# Patient Record
Sex: MALE | Race: ASIAN | NOT HISPANIC OR LATINO | Employment: FULL TIME | URBAN - METROPOLITAN AREA
[De-identification: names, ages, dates, MRNs, and addresses within clinical notes are randomized per-mention and may not be internally consistent; named-entity substitution may affect disease eponyms.]

---

## 2024-02-15 ENCOUNTER — APPOINTMENT (OUTPATIENT)
Dept: RADIOLOGY | Facility: MEDICAL CENTER | Age: 39
DRG: 305 | End: 2024-02-15
Attending: EMERGENCY MEDICINE
Payer: COMMERCIAL

## 2024-02-15 ENCOUNTER — OFFICE VISIT (OUTPATIENT)
Dept: URGENT CARE | Facility: PHYSICIAN GROUP | Age: 39
End: 2024-02-15
Payer: COMMERCIAL

## 2024-02-15 ENCOUNTER — HOSPITAL ENCOUNTER (INPATIENT)
Facility: MEDICAL CENTER | Age: 39
LOS: 1 days | DRG: 305 | End: 2024-02-17
Attending: EMERGENCY MEDICINE | Admitting: STUDENT IN AN ORGANIZED HEALTH CARE EDUCATION/TRAINING PROGRAM
Payer: COMMERCIAL

## 2024-02-15 VITALS
SYSTOLIC BLOOD PRESSURE: 158 MMHG | RESPIRATION RATE: 16 BRPM | BODY MASS INDEX: 29.31 KG/M2 | OXYGEN SATURATION: 98 % | HEIGHT: 71 IN | HEART RATE: 53 BPM | TEMPERATURE: 97.5 F | DIASTOLIC BLOOD PRESSURE: 108 MMHG | WEIGHT: 209.4 LBS

## 2024-02-15 DIAGNOSIS — R53.1 GENERALIZED WEAKNESS: ICD-10-CM

## 2024-02-15 DIAGNOSIS — R42 DIZZINESS: ICD-10-CM

## 2024-02-15 DIAGNOSIS — R73.03 PREDIABETES: ICD-10-CM

## 2024-02-15 DIAGNOSIS — I16.0 HYPERTENSIVE URGENCY: ICD-10-CM

## 2024-02-15 DIAGNOSIS — R07.9 CHEST PAIN, UNSPECIFIED TYPE: ICD-10-CM

## 2024-02-15 DIAGNOSIS — I50.21 ACUTE HFREF (HEART FAILURE WITH REDUCED EJECTION FRACTION) (HCC): ICD-10-CM

## 2024-02-15 DIAGNOSIS — R06.02 SOB (SHORTNESS OF BREATH): ICD-10-CM

## 2024-02-15 DIAGNOSIS — R59.0 MEDIASTINAL ADENOPATHY: ICD-10-CM

## 2024-02-15 DIAGNOSIS — R94.31 PROLONGED QT INTERVAL: ICD-10-CM

## 2024-02-15 DIAGNOSIS — R03.0 ELEVATED BLOOD PRESSURE READING: ICD-10-CM

## 2024-02-15 DIAGNOSIS — R91.1 PULMONARY NODULE: ICD-10-CM

## 2024-02-15 DIAGNOSIS — I50.20 HFREF (HEART FAILURE WITH REDUCED EJECTION FRACTION) (HCC): ICD-10-CM

## 2024-02-15 DIAGNOSIS — R91.1 INCIDENTAL LUNG NODULE, > 3MM AND < 8MM: ICD-10-CM

## 2024-02-15 PROBLEM — R79.89 ELEVATED TROPONIN: Status: ACTIVE | Noted: 2024-02-15

## 2024-02-15 PROBLEM — R80.1 PERSISTENT PROTEINURIA: Status: ACTIVE | Noted: 2021-01-19

## 2024-02-15 PROBLEM — R74.8 LIVER ENZYME ELEVATION: Status: ACTIVE | Noted: 2021-01-19

## 2024-02-15 PROBLEM — R73.9 ELEVATED BLOOD SUGAR: Status: ACTIVE | Noted: 2024-02-15

## 2024-02-15 LAB
ALBUMIN SERPL BCP-MCNC: 3.9 G/DL (ref 3.2–4.9)
ALBUMIN/GLOB SERPL: 1.3 G/DL
ALP SERPL-CCNC: 78 U/L (ref 30–99)
ALT SERPL-CCNC: 53 U/L (ref 2–50)
AMPHET UR QL SCN: NEGATIVE
ANION GAP SERPL CALC-SCNC: 14 MMOL/L (ref 7–16)
APPEARANCE UR: CLEAR
AST SERPL-CCNC: 38 U/L (ref 12–45)
BACTERIA #/AREA URNS HPF: NEGATIVE /HPF
BARBITURATES UR QL SCN: NEGATIVE
BASOPHILS # BLD AUTO: 1.3 % (ref 0–1.8)
BASOPHILS # BLD: 0.12 K/UL (ref 0–0.12)
BENZODIAZ UR QL SCN: NEGATIVE
BILIRUB SERPL-MCNC: 1.4 MG/DL (ref 0.1–1.5)
BILIRUB UR QL STRIP.AUTO: NEGATIVE
BUN SERPL-MCNC: 16 MG/DL (ref 8–22)
BZE UR QL SCN: NEGATIVE
CALCIUM ALBUM COR SERPL-MCNC: 9 MG/DL (ref 8.5–10.5)
CALCIUM SERPL-MCNC: 8.9 MG/DL (ref 8.5–10.5)
CANNABINOIDS UR QL SCN: POSITIVE
CHLORIDE SERPL-SCNC: 108 MMOL/L (ref 96–112)
CO2 SERPL-SCNC: 20 MMOL/L (ref 20–33)
COLOR UR: YELLOW
CREAT SERPL-MCNC: 1.39 MG/DL (ref 0.5–1.4)
EKG IMPRESSION: NORMAL
EKG IMPRESSION: NORMAL
EOSINOPHIL # BLD AUTO: 0.17 K/UL (ref 0–0.51)
EOSINOPHIL NFR BLD: 1.8 % (ref 0–6.9)
EPI CELLS #/AREA URNS HPF: NEGATIVE /HPF
ERYTHROCYTE [DISTWIDTH] IN BLOOD BY AUTOMATED COUNT: 38.5 FL (ref 35.9–50)
EST. AVERAGE GLUCOSE BLD GHB EST-MCNC: 128 MG/DL
FENTANYL UR QL: NEGATIVE
GFR SERPLBLD CREATININE-BSD FMLA CKD-EPI: 67 ML/MIN/1.73 M 2
GLOBULIN SER CALC-MCNC: 2.9 G/DL (ref 1.9–3.5)
GLUCOSE SERPL-MCNC: 108 MG/DL (ref 65–99)
GLUCOSE UR STRIP.AUTO-MCNC: NEGATIVE MG/DL
HBA1C MFR BLD: 6.1 % (ref 4–5.6)
HCT VFR BLD AUTO: 49.6 % (ref 42–52)
HGB BLD-MCNC: 16.6 G/DL (ref 14–18)
HYALINE CASTS #/AREA URNS LPF: ABNORMAL /LPF
IMM GRANULOCYTES # BLD AUTO: 0.04 K/UL (ref 0–0.11)
IMM GRANULOCYTES NFR BLD AUTO: 0.4 % (ref 0–0.9)
KETONES UR STRIP.AUTO-MCNC: NEGATIVE MG/DL
LEUKOCYTE ESTERASE UR QL STRIP.AUTO: NEGATIVE
LYMPHOCYTES # BLD AUTO: 1.66 K/UL (ref 1–4.8)
LYMPHOCYTES NFR BLD: 17.3 % (ref 22–41)
MCH RBC QN AUTO: 25.9 PG (ref 27–33)
MCHC RBC AUTO-ENTMCNC: 33.5 G/DL (ref 32.3–36.5)
MCV RBC AUTO: 77.3 FL (ref 81.4–97.8)
METHADONE UR QL SCN: NEGATIVE
MICRO URNS: ABNORMAL
MONOCYTES # BLD AUTO: 0.48 K/UL (ref 0–0.85)
MONOCYTES NFR BLD AUTO: 5 % (ref 0–13.4)
NEUTROPHILS # BLD AUTO: 7.12 K/UL (ref 1.82–7.42)
NEUTROPHILS NFR BLD: 74.2 % (ref 44–72)
NITRITE UR QL STRIP.AUTO: NEGATIVE
NRBC # BLD AUTO: 0 K/UL
NRBC BLD-RTO: 0 /100 WBC (ref 0–0.2)
OPIATES UR QL SCN: NEGATIVE
OXYCODONE UR QL SCN: NEGATIVE
PCP UR QL SCN: NEGATIVE
PH UR STRIP.AUTO: 7 [PH] (ref 5–8)
PLATELET # BLD AUTO: 291 K/UL (ref 164–446)
PMV BLD AUTO: 9.3 FL (ref 9–12.9)
POTASSIUM SERPL-SCNC: 4.2 MMOL/L (ref 3.6–5.5)
PROPOXYPH UR QL SCN: NEGATIVE
PROT SERPL-MCNC: 6.8 G/DL (ref 6–8.2)
PROT UR QL STRIP: 100 MG/DL
RBC # BLD AUTO: 6.42 M/UL (ref 4.7–6.1)
RBC # URNS HPF: ABNORMAL /HPF
RBC UR QL AUTO: NEGATIVE
SODIUM SERPL-SCNC: 142 MMOL/L (ref 135–145)
SP GR UR STRIP.AUTO: 1.03
T4 FREE SERPL-MCNC: 1.96 NG/DL (ref 0.93–1.7)
TROPONIN T SERPL-MCNC: 29 NG/L (ref 6–19)
TROPONIN T SERPL-MCNC: 30 NG/L (ref 6–19)
TSH SERPL DL<=0.005 MIU/L-ACNC: 1.2 UIU/ML (ref 0.38–5.33)
UROBILINOGEN UR STRIP.AUTO-MCNC: 0.2 MG/DL
WBC # BLD AUTO: 9.6 K/UL (ref 4.8–10.8)
WBC #/AREA URNS HPF: ABNORMAL /HPF

## 2024-02-15 PROCEDURE — 85025 COMPLETE CBC W/AUTO DIFF WBC: CPT

## 2024-02-15 PROCEDURE — 81001 URINALYSIS AUTO W/SCOPE: CPT

## 2024-02-15 PROCEDURE — 71275 CT ANGIOGRAPHY CHEST: CPT

## 2024-02-15 PROCEDURE — 80053 COMPREHEN METABOLIC PANEL: CPT

## 2024-02-15 PROCEDURE — G0378 HOSPITAL OBSERVATION PER HR: HCPCS

## 2024-02-15 PROCEDURE — 36415 COLL VENOUS BLD VENIPUNCTURE: CPT

## 2024-02-15 PROCEDURE — 83036 HEMOGLOBIN GLYCOSYLATED A1C: CPT

## 2024-02-15 PROCEDURE — 99205 OFFICE O/P NEW HI 60 MIN: CPT | Performed by: STUDENT IN AN ORGANIZED HEALTH CARE EDUCATION/TRAINING PROGRAM

## 2024-02-15 PROCEDURE — 80307 DRUG TEST PRSMV CHEM ANLYZR: CPT

## 2024-02-15 PROCEDURE — 700102 HCHG RX REV CODE 250 W/ 637 OVERRIDE(OP): Performed by: STUDENT IN AN ORGANIZED HEALTH CARE EDUCATION/TRAINING PROGRAM

## 2024-02-15 PROCEDURE — 99205 OFFICE O/P NEW HI 60 MIN: CPT | Performed by: NURSE PRACTITIONER

## 2024-02-15 PROCEDURE — 96375 TX/PRO/DX INJ NEW DRUG ADDON: CPT

## 2024-02-15 PROCEDURE — 96374 THER/PROPH/DIAG INJ IV PUSH: CPT

## 2024-02-15 PROCEDURE — 99285 EMERGENCY DEPT VISIT HI MDM: CPT

## 2024-02-15 PROCEDURE — 3E02340 INTRODUCTION OF INFLUENZA VACCINE INTO MUSCLE, PERCUTANEOUS APPROACH: ICD-10-PCS | Performed by: STUDENT IN AN ORGANIZED HEALTH CARE EDUCATION/TRAINING PROGRAM

## 2024-02-15 PROCEDURE — 90686 IIV4 VACC NO PRSV 0.5 ML IM: CPT | Performed by: STUDENT IN AN ORGANIZED HEALTH CARE EDUCATION/TRAINING PROGRAM

## 2024-02-15 PROCEDURE — 93005 ELECTROCARDIOGRAM TRACING: CPT | Performed by: EMERGENCY MEDICINE

## 2024-02-15 PROCEDURE — 90471 IMMUNIZATION ADMIN: CPT

## 2024-02-15 PROCEDURE — 96376 TX/PRO/DX INJ SAME DRUG ADON: CPT

## 2024-02-15 PROCEDURE — 700111 HCHG RX REV CODE 636 W/ 250 OVERRIDE (IP): Performed by: STUDENT IN AN ORGANIZED HEALTH CARE EDUCATION/TRAINING PROGRAM

## 2024-02-15 PROCEDURE — 3080F DIAST BP >= 90 MM HG: CPT | Performed by: NURSE PRACTITIONER

## 2024-02-15 PROCEDURE — 84443 ASSAY THYROID STIM HORMONE: CPT

## 2024-02-15 PROCEDURE — 84439 ASSAY OF FREE THYROXINE: CPT

## 2024-02-15 PROCEDURE — 84484 ASSAY OF TROPONIN QUANT: CPT

## 2024-02-15 PROCEDURE — 71045 X-RAY EXAM CHEST 1 VIEW: CPT

## 2024-02-15 PROCEDURE — 700111 HCHG RX REV CODE 636 W/ 250 OVERRIDE (IP): Mod: JZ | Performed by: EMERGENCY MEDICINE

## 2024-02-15 PROCEDURE — A9270 NON-COVERED ITEM OR SERVICE: HCPCS | Performed by: STUDENT IN AN ORGANIZED HEALTH CARE EDUCATION/TRAINING PROGRAM

## 2024-02-15 PROCEDURE — 700105 HCHG RX REV CODE 258: Performed by: EMERGENCY MEDICINE

## 2024-02-15 PROCEDURE — 3077F SYST BP >= 140 MM HG: CPT | Performed by: NURSE PRACTITIONER

## 2024-02-15 PROCEDURE — 93005 ELECTROCARDIOGRAM TRACING: CPT

## 2024-02-15 PROCEDURE — 700117 HCHG RX CONTRAST REV CODE 255: Performed by: EMERGENCY MEDICINE

## 2024-02-15 RX ORDER — HYDRALAZINE HYDROCHLORIDE 20 MG/ML
10 INJECTION INTRAMUSCULAR; INTRAVENOUS ONCE
Status: COMPLETED | OUTPATIENT
Start: 2024-02-15 | End: 2024-02-15

## 2024-02-15 RX ORDER — LABETALOL HYDROCHLORIDE 5 MG/ML
10 INJECTION, SOLUTION INTRAVENOUS EVERY 4 HOURS PRN
Status: DISCONTINUED | OUTPATIENT
Start: 2024-02-15 | End: 2024-02-15

## 2024-02-15 RX ORDER — SODIUM CHLORIDE 9 MG/ML
1000 INJECTION, SOLUTION INTRAVENOUS ONCE
Status: COMPLETED | OUTPATIENT
Start: 2024-02-15 | End: 2024-02-15

## 2024-02-15 RX ORDER — ACETAMINOPHEN 325 MG/1
650 TABLET ORAL EVERY 6 HOURS PRN
Status: DISCONTINUED | OUTPATIENT
Start: 2024-02-15 | End: 2024-02-17 | Stop reason: HOSPADM

## 2024-02-15 RX ORDER — LORAZEPAM 2 MG/ML
2 INJECTION INTRAMUSCULAR EVERY 4 HOURS PRN
Status: DISCONTINUED | OUTPATIENT
Start: 2024-02-15 | End: 2024-02-17 | Stop reason: HOSPADM

## 2024-02-15 RX ORDER — LORAZEPAM 2 MG/ML
0.5 INJECTION INTRAMUSCULAR ONCE
Status: COMPLETED | OUTPATIENT
Start: 2024-02-15 | End: 2024-02-15

## 2024-02-15 RX ORDER — LISINOPRIL 20 MG/1
20 TABLET ORAL EVERY EVENING
Status: DISCONTINUED | OUTPATIENT
Start: 2024-02-15 | End: 2024-02-16

## 2024-02-15 RX ORDER — CARVEDILOL 12.5 MG/1
12.5 TABLET ORAL 2 TIMES DAILY WITH MEALS
Status: DISCONTINUED | OUTPATIENT
Start: 2024-02-15 | End: 2024-02-16

## 2024-02-15 RX ORDER — ONDANSETRON 2 MG/ML
4 INJECTION INTRAMUSCULAR; INTRAVENOUS ONCE
Status: COMPLETED | OUTPATIENT
Start: 2024-02-15 | End: 2024-02-15

## 2024-02-15 RX ORDER — LABETALOL HYDROCHLORIDE 5 MG/ML
10 INJECTION, SOLUTION INTRAVENOUS ONCE
Status: COMPLETED | OUTPATIENT
Start: 2024-02-15 | End: 2024-02-15

## 2024-02-15 RX ORDER — HYDRALAZINE HYDROCHLORIDE 20 MG/ML
20 INJECTION INTRAMUSCULAR; INTRAVENOUS EVERY 6 HOURS PRN
Status: DISCONTINUED | OUTPATIENT
Start: 2024-02-15 | End: 2024-02-17 | Stop reason: HOSPADM

## 2024-02-15 RX ORDER — ASPIRIN 325 MG
650 TABLET ORAL 2 TIMES DAILY PRN
Status: ON HOLD | COMMUNITY
End: 2024-02-17

## 2024-02-15 RX ORDER — MORPHINE SULFATE 4 MG/ML
4 INJECTION INTRAVENOUS EVERY 4 HOURS PRN
Status: DISCONTINUED | OUTPATIENT
Start: 2024-02-15 | End: 2024-02-17 | Stop reason: HOSPADM

## 2024-02-15 RX ADMIN — CARVEDILOL 12.5 MG: 12.5 TABLET, FILM COATED ORAL at 23:10

## 2024-02-15 RX ADMIN — INFLUENZA A VIRUS A/VICTORIA/4897/2022 IVR-238 (H1N1) ANTIGEN (FORMALDEHYDE INACTIVATED), INFLUENZA A VIRUS A/DARWIN/9/2021 SAN-010 (H3N2) ANTIGEN (FORMALDEHYDE INACTIVATED), INFLUENZA B VIRUS B/PHUKET/3073/2013 ANTIGEN (FORMALDEHYDE INACTIVATED), AND INFLUENZA B VIRUS B/MICHIGAN/01/2021 ANTIGEN (FORMALDEHYDE INACTIVATED) 0.5 ML: 15; 15; 15; 15 INJECTION, SUSPENSION INTRAMUSCULAR at 23:10

## 2024-02-15 RX ADMIN — HYDRALAZINE HYDROCHLORIDE 10 MG: 20 INJECTION, SOLUTION INTRAMUSCULAR; INTRAVENOUS at 18:22

## 2024-02-15 RX ADMIN — LABETALOL HYDROCHLORIDE 10 MG: 5 INJECTION, SOLUTION INTRAVENOUS at 16:10

## 2024-02-15 RX ADMIN — SODIUM CHLORIDE 1000 ML: 9 INJECTION, SOLUTION INTRAVENOUS at 20:51

## 2024-02-15 RX ADMIN — HYDRALAZINE HYDROCHLORIDE 10 MG: 20 INJECTION, SOLUTION INTRAMUSCULAR; INTRAVENOUS at 16:53

## 2024-02-15 RX ADMIN — LORAZEPAM 0.5 MG: 2 INJECTION INTRAMUSCULAR; INTRAVENOUS at 14:21

## 2024-02-15 RX ADMIN — MORPHINE SULFATE 4 MG: 4 INJECTION, SOLUTION INTRAMUSCULAR; INTRAVENOUS at 21:11

## 2024-02-15 RX ADMIN — LABETALOL HYDROCHLORIDE 10 MG: 5 INJECTION, SOLUTION INTRAVENOUS at 14:21

## 2024-02-15 RX ADMIN — IOHEXOL 100 ML: 350 INJECTION, SOLUTION INTRAVENOUS at 16:43

## 2024-02-15 RX ADMIN — LISINOPRIL 20 MG: 20 TABLET ORAL at 20:48

## 2024-02-15 RX ADMIN — ONDANSETRON 4 MG: 2 INJECTION INTRAMUSCULAR; INTRAVENOUS at 18:18

## 2024-02-15 ASSESSMENT — PAIN DESCRIPTION - PAIN TYPE
TYPE: ACUTE PAIN
TYPE: ACUTE PAIN

## 2024-02-15 ASSESSMENT — PATIENT HEALTH QUESTIONNAIRE - PHQ9
2. FEELING DOWN, DEPRESSED, IRRITABLE, OR HOPELESS: NOT AT ALL
1. LITTLE INTEREST OR PLEASURE IN DOING THINGS: NOT AT ALL
SUM OF ALL RESPONSES TO PHQ9 QUESTIONS 1 AND 2: 0

## 2024-02-15 ASSESSMENT — ENCOUNTER SYMPTOMS
PALPITATIONS: 1
SHORTNESS OF BREATH: 1
COUGH: 0
VOMITING: 0
SORE THROAT: 0
NERVOUS/ANXIOUS: 0
FEVER: 0
HEARTBURN: 0
DIZZINESS: 1

## 2024-02-15 ASSESSMENT — LIFESTYLE VARIABLES
ALCOHOL_USE: YES
TOTAL SCORE: 0
DOES PATIENT WANT TO STOP DRINKING: NO
ON A TYPICAL DAY WHEN YOU DRINK ALCOHOL HOW MANY DRINKS DO YOU HAVE: 0
TOTAL SCORE: 0
DO YOU DRINK ALCOHOL: NO
HOW MANY TIMES IN THE PAST YEAR HAVE YOU HAD 5 OR MORE DRINKS IN A DAY: 0
HAVE PEOPLE ANNOYED YOU BY CRITICIZING YOUR DRINKING: NO
AVERAGE NUMBER OF DAYS PER WEEK YOU HAVE A DRINK CONTAINING ALCOHOL: 0
CONSUMPTION TOTAL: NEGATIVE
TOTAL SCORE: 0
EVER FELT BAD OR GUILTY ABOUT YOUR DRINKING: NO
EVER HAD A DRINK FIRST THING IN THE MORNING TO STEADY YOUR NERVES TO GET RID OF A HANGOVER: NO
HAVE YOU EVER FELT YOU SHOULD CUT DOWN ON YOUR DRINKING: NO

## 2024-02-15 ASSESSMENT — FIBROSIS 4 INDEX: FIB4 SCORE: 0.65

## 2024-02-15 NOTE — ED TRIAGE NOTES
"Chief Complaint   Patient presents with    Chest Pain     'Sharp / twisting' pain 2 days ago lasting for 4 hours, L chest, associated exhaustion during and afterwards, denies cardiac hx, pt states he has not had chest pain since then, seen in urgent care today and sent here for further workup       Pt ambulatory to triage for above complaints, VSS on RA, GCS 15, NAD.    [facility] Educated on triage process and to inform staff of any changes.     BP (!) 191/152   Pulse (!) 111   Temp 36.2 °C (97.1 °F) (Temporal)   Resp 16   Ht 1.778 m (5' 10\")   Wt 94.4 kg (208 lb 1.8 oz)   SpO2 98%   BMI 29.86 kg/m²     "

## 2024-02-15 NOTE — ED NOTES
Pt ambulated to red 12  Provided with gown to change, placed monitor  Iv started,blood sent to lab

## 2024-02-15 NOTE — ED PROVIDER NOTES
"  ER Provider Note    Scribed for Morena Garcia M.d. by Azul Cross. 2/15/2024  1:29 PM    Primary Care Provider: Pcp Pt States None    CHIEF COMPLAINT  Chief Complaint   Patient presents with    Chest Pain     'Sharp / twisting' pain 2 days ago lasting for 4 hours, L chest, associated exhaustion during and afterwards, denies cardiac hx, pt states he has not had chest pain since then, seen in urgent care today and sent here for further workup       EXTERNAL RECORDS REVIEWED  Outpatient Notes Patient was seen by [facility] via telemedicine January 2021 for proteinuria. Reports at that time that he has not seen a doctor for 8 years. Reports no medical problems at that time, other than glucose intolerance. Patient went to Urgent Care earlier today and was sent here for further evaluation.      HPI/ROS  LIMITATION TO HISTORY   Select: : None  OUTSIDE HISTORIAN(S):  None    Gaetano Boles is a 38 y.o. male who presents to the ED complaining of chest pain onset two days ago.  The patient reports that he was driving in his vehicle 2 days ago when his pain began and notes that it lasted for about  approximately 4 hours. He describes his pain as \"twisting\" pain by his heart and next to his lungs.  He has not had any chest pain since that episode.  Patient reports feeling lightheaded, diaphoretic, short of breath and nauseated during this episode of chest pain.  He notes that he does not have chest pain now.  However, the patient reports that he has been feeling exhausted since that episode of chest pain 2 days ago, and it is for that reason that he came to the ER.. He notes that he went to Urgent Care earlier today, where they performed a EKG and noticed an abnormality. He was sent to the ED for further evaluation. Since arriving here, he has been feeling waves of lightheadedness and fatigue. He describes his lightheadedness as \"sea sickness.\"  He said he had some mild abdominal pain today as well. He " "denies any numbnessor tingling to extremities, weakness of extremities, nausea, vomiting, pain or swelling in his legs, cough, or runny nose. He denies having pain like this before. He is unsure of a history of hypertension. He denies any family history of heart problems.     PAST MEDICAL HISTORY  History reviewed. No pertinent past medical history.    SURGICAL HISTORY  No past surgical history noted.     FAMILY HISTORY  No family history noted.     SOCIAL HISTORY   None noted.     CURRENT MEDICATIONS  No current outpatient medications     ALLERGIES  Patient has no known allergies.    PHYSICAL EXAM  BP (!) 199/147   Pulse (!) 101   Temp 36.2 °C (97.1 °F) (Temporal)   Resp 20   Ht 1.778 m (5' 10\")   Wt 94.4 kg (208 lb 1.8 oz)   SpO2 95%   BMI 29.86 kg/m²   Constitutional: Anxious appearing, Well developed, well nourished;   HENT: Normocephalic, Atraumatic, Bilateral external ears normal, moist mucous membranes.  Eyes: PERRL, EOMI, Conjunctiva normal, No discharge.   Neck: Normal range of motion, supple, nontender  Lymphatic: No lymphadenopathy noted.   Cardiovascular: Tachycardic but regular., Normal rhythm, No murmurs, rubs or gallops   Thorax & Lungs: CTA=bilaterally;  No respiratory distress,  No wheezing rales, or rhonchi; No chest tenderness. No crepitus or subQ air  Abdomen: Nontender, no guarding no rebound, no masses, no pulsatile mass, no tenderness, no distention  Skin: Warm, Dry, No erythema, No rash.   Back: No tenderness, No CVA tenderness.   Extremities: 2+ dp and pt pulses bilateral LEs;  Nontender; no pretibial edema  Neurologic: Alert & oriented x 4, clear speech  Psychiatric: appropriate, normal affect     DIAGNOSTIC STUDIES    Labs:   I have independently interpreted these labs.    Results for orders placed or performed during the hospital encounter of 02/15/24   CBC with Differential   Result Value Ref Range    WBC 9.6 4.8 - 10.8 K/uL    RBC 6.42 (H) 4.70 - 6.10 M/uL    Hemoglobin 16.6 14.0 " - 18.0 g/dL    Hematocrit 49.6 42.0 - 52.0 %    MCV 77.3 (L) 81.4 - 97.8 fL    MCH 25.9 (L) 27.0 - 33.0 pg    MCHC 33.5 32.3 - 36.5 g/dL    RDW 38.5 35.9 - 50.0 fL    Platelet Count 291 164 - 446 K/uL    MPV 9.3 9.0 - 12.9 fL    Neutrophils-Polys 74.20 (H) 44.00 - 72.00 %    Lymphocytes 17.30 (L) 22.00 - 41.00 %    Monocytes 5.00 0.00 - 13.40 %    Eosinophils 1.80 0.00 - 6.90 %    Basophils 1.30 0.00 - 1.80 %    Immature Granulocytes 0.40 0.00 - 0.90 %    Nucleated RBC 0.00 0.00 - 0.20 /100 WBC    Neutrophils (Absolute) 7.12 1.82 - 7.42 K/uL    Lymphs (Absolute) 1.66 1.00 - 4.80 K/uL    Monos (Absolute) 0.48 0.00 - 0.85 K/uL    Eos (Absolute) 0.17 0.00 - 0.51 K/uL    Baso (Absolute) 0.12 0.00 - 0.12 K/uL    Immature Granulocytes (abs) 0.04 0.00 - 0.11 K/uL    NRBC (Absolute) 0.00 K/uL   Complete Metabolic Panel (CMP)   Result Value Ref Range    Sodium 142 135 - 145 mmol/L    Potassium 4.2 3.6 - 5.5 mmol/L    Chloride 108 96 - 112 mmol/L    Co2 20 20 - 33 mmol/L    Anion Gap 14.0 7.0 - 16.0    Glucose 108 (H) 65 - 99 mg/dL    Bun 16 8 - 22 mg/dL    Creatinine 1.39 0.50 - 1.40 mg/dL    Calcium 8.9 8.5 - 10.5 mg/dL    Correct Calcium 9.0 8.5 - 10.5 mg/dL    AST(SGOT) 38 12 - 45 U/L    ALT(SGPT) 53 (H) 2 - 50 U/L    Alkaline Phosphatase 78 30 - 99 U/L    Total Bilirubin 1.4 0.1 - 1.5 mg/dL    Albumin 3.9 3.2 - 4.9 g/dL    Total Protein 6.8 6.0 - 8.2 g/dL    Globulin 2.9 1.9 - 3.5 g/dL    A-G Ratio 1.3 g/dL   Troponins NOW   Result Value Ref Range    Troponin T 29 (H) 6 - 19 ng/L   Troponins in two (2) hours   Result Value Ref Range    Troponin T 30 (H) 6 - 19 ng/L   ESTIMATED GFR   Result Value Ref Range    GFR (CKD-EPI) 67 >60 mL/min/1.73 m 2   URINE DRUG SCREEN   Result Value Ref Range    Amphetamines Urine Negative Negative    Barbiturates Negative Negative    Benzodiazepines Negative Negative    Cocaine Metabolite Negative Negative    Fentanyl, Urine Negative Negative    Methadone Negative Negative    Opiates  Negative Negative    Oxycodone Negative Negative    Phencyclidine -Pcp Negative Negative    Propoxyphene Negative Negative    Cannabinoid Metab Positive (A) Negative   TSH   Result Value Ref Range    TSH 1.200 0.380 - 5.330 uIU/mL   FREE THYROXINE   Result Value Ref Range    Free T-4 1.96 (H) 0.93 - 1.70 ng/dL   HEMOGLOBIN A1C   Result Value Ref Range    Glycohemoglobin 6.1 (H) 4.0 - 5.6 %    Est Avg Glucose 128 mg/dL   EKG   Result Value Ref Range    Report       [facility] Emergency Dept.    Test Date:  2024-02-15  Pt Name:    ARELY IGNACIO                   Department: ER  MRN:        [id number]                      Room:  Gender:     Male                         Technician: [id number]  :        1985                   Requested By:ER TRIAGE PROTOCOL  Order #:    [id number]                    Reading MD: Morena Garcia    Measurements  Intervals                                Axis  Rate:       112                          P:          45  AZ:         165                          QRS:        4  QRSD:       104                          T:          52  QT:         365  QTc:        499    Interpretive Statements  Sinus tachycardia rate 112  Normal axis  Minimal ST depression II, III, AVF, V5 and V6  No ST elevation  Left atrial enlargement  Left ventricular hypertrophy  Anterior ST elevation, probably due to LVH  Borderline prolonged QT interval  No previous ECG available for comparison  Electronically Signed On 02- 21:19:20 PST  by Morena Garcia     EKG   Result Value Ref Range    Report       [facility] Emergency Dept.    Test Date:  2024-02-15  Pt Name:    ARELY IGNACIO                   Department: ER  MRN:        [id number]                      Room:  Gender:     Male                         Technician: [id number]  :        1985                   Requested By:ER TRIAGE PROTOCOL  Order #:    [id number]                    Reading MD: Morena Garcia    Measurements  Intervals                                 Axis  Rate:       109                          P:          44  PA:         165                          QRS:        9  QRSD:       105                          T:          47  QT:         375  QTc:        506    Interpretive Statements  Sinus tachycardia rate 109  Normal axis  Minimal ST depression II, III, AVF, V5 and V6  NO ST elevation  Left atrial enlargement  Left ventricular hypertrophy  Prolonged QT interval  Compared to ECG 02/15/2024 12:12:58  ST (T wave) deviation still present  Electronically Signed On 02- 21:20:49 PST by Morena Garcia        EK Lead EKG interpreted by me as shown above       Radiology:   The attending emergency physician has independently interpreted the diagnostic imaging associated with this visit and am waiting the final reading from the radiologist.     Preliminary interpretation is a follows: ER MD is reviewed the patient's chest x-ray.  He appears to have some cardiomegaly.    Radiologist interpretation:   CT-CTA COMPLETE THORACOABDOMINAL AORTA   Final Result      1.  No evidence of aortic aneurysm or dissection.      2.  Enlarged mediastinal and right hilar lymph nodes. Consideration should be given for infectious or neoplastic process.      3.  4 mm pulmonary nodule within the right lower lobe.      4.  Fatty liver.      5.  Mild splenomegaly.      Fleischner Society pulmonary nodule recommendations:   Low Risk: No routine follow-up      High Risk: Optional CT at 12 months      Comments: Nodules less than 6 mm do not require routine follow-up, but certain patients at high risk with suspicious nodule morphology, upper lobe location, or both may warrant 12-month follow-up.      Low Risk - Minimal or absent history of smoking and of other known risk factors.      High Risk - History of smoking or of other known risk factors.      Note: These recommendations do not apply to lung cancer screening, patients with immunosuppression, or patients with known  primary cancer.      Fleischner Society 2017 Guidelines for Management of Incidentally Detected Pulmonary Nodules in Adults                           DX-CHEST-PORTABLE (1 VIEW)   Final Result      No acute cardiac or pulmonary abnormalities are identified.      NM-CARDIAC STRESS TEST    (Results Pending)   EC-ECHOCARDIOGRAM COMPLETE W/O CONT    (Results Pending)        COURSE & MEDICAL DECISION MAKING     ED Observation Status? No; Patient does not meet criteria for ED Observation.     INITIAL ASSESSMENT, COURSE AND PLAN  Care Narrative: Patient presents to the ER complaining of generalized fatigue and weakness which began 2 days ago after an episode of chest pain.  He was driving in his car when he suddenly had a twisting feeling in his chest.  The symptoms lasted about 4 hours.  He had associated shortness of breath, lightheadedness, diaphoresis and nausea with his chest pain.  Chest pain resolved and has not returned.  He is currently chest pain-free here in the ER.  He went to urgent care today over the concern of his generalized weakness since the onset of chest pain and was referred here to the emergency department over concern about an abnormal EKG.  Patient's EKG reveals what looks like LVH with a little bit of ST depression in the inferior leads.  There is no ST elevation.  Patient's troponin was initially elevated at 29.  Repeat troponin came back elevated at 30.  Patient has not had any chest pain in the last couple days.  Again, he is currently chest pain-free here in the ER.  Blood pressure is extremely high upon arrival.  Initial blood pressure was 191/152.  He was given several rounds of labetalol as well as several rounds of hydralazine and repeat blood pressure after multiple rounds of IV antihypertensives is now 175/121.  He has no known history of hypertension, but given his mildly enlarged heart on chest x-ray and his LVH on EKG, I suspect patient likely has history of hypertension.  Review of  records revealed that he was seen by nephrology for proteinuria several years ago, but I do not really know what came of that.  It was a telemedicine visit at that time.  Patient does not know his family history.  He has not seen a doctor in many many years.  Given patient's markedly elevated blood pressure with complaint of chest pain, lightheadedness, and feeling generally weak, I was concerned about the possibility of an aortic dissection which caused his chest pain.  CT thoracoabdominal aorta was performed and it is negative for any dissection.  The patient does have some enlarged mediastinal adenopathy, however, of unclear etiology at this time.  He also has a small pulmonary nodule which will need to be followed up.  Patient was advised of these findings and understands importance of follow-up with primary care upon discharge from the hospital.  Patient will need to be admitted for hypertensive urgency and abnormal EKG, likely related to hypertension.  At this time there is no evidence of STEMI or non-STEMI.  No evidence of stroke.  He has no headache.  I do not think he needs a CT brain.  He does, however, need aggressive blood pressure management as an inpatient and cardiac workup.  I spoke with Dr. Kenny Mccann, hospitalist on-call, he will kindly evaluate the patient hospitalization.    3:21 PM - Patient presents to the ED with chest pain, dizziness, shortness of breath, sweating, abdominal pain, and fatigue. Patient denies any numbness, nausea, vomiting, tingling, pain or swelling in his legs, cough, or runny nose. See HPI for further details. Discussed the plan of care, including ordering labs and imaging for further evaluation. Patient verbalizes understanding and agreement to this plan of care.     4:05 PM - Patient was reevaluated at bedside. Discussed lab and radiology results with the patient.     6:20 PM - Patient was reevaluated at bedside. Patient had just vomited and is hypertensive.     6:49  PM - I discussed the patient's case and the above findings with Dr. Mccann (Hospitalist) who agrees to evaluate the patient for admission.     6:52 PM - Patient was reevaluated at bedside. The patient's blood pressure is 173/121. His nausea has resolved after administration of Zofran.. Denies any chest pain or headache at this time. The patient denies having any history of hypertension. Discussed lab and radiology results with the patient and informed them that his blood pressure is elevated and that the left side of his heart appears enlarged. Discussed the plan for admission with the patient. The patient was informed at this time and agrees to the plan of care.     ER MD advised the patient about the CT findings of some mediastinal adenopathy as well as pulmonary nodule which will need follow-up  Upon discharge from the hospital.    HTN/IDDM FOLLOW UP:  The patient is referred to a primary physician for blood pressure management, diabetic screening, and for all other preventive health concerns    ADDITIONAL PROBLEM LIST  Problem #1: Chest pain for approximately 4 hours 2 days ago.  No chest pain since then.  Problem #2: Intermittent lightheadedness over the last several days  Problem #3: Generalized fatigue x 2 days since episode of chest pain.    DISPOSITION AND DISCUSSIONS  I have discussed management of the patient with the following physicians and YURY's: Dr. Mccann (Hospitalist)    Discussion of management with other [facility] or appropriate source(s): none     Escalation of care considered, and ultimately not performed: diagnostic imaging.  No complaints of headache or any neurologic symptoms.  No need for CT brain at this time.    Barriers to care at this time, including but not limited to: Patient does not have established PCP.     Decision tools and prescription drugs considered including, but not limited to: Antibiotics no evidence of pneumonia.  No need for antibiotics. .    CRITICAL CARE  The very real  possibilty of a deterioration of this patient's condition required the highest level of my preparedness for sudden, emergent intervention.  I provided critical care services, which included medication orders, frequent reevaluations of the patient's condition and response to treatment, ordering and reviewing test results, and discussing the case with various consultants.  The critical care time associated with the care of the patient was 35 minutes. Review chart for interventions. This time is exclusive of any other billable procedures.      DISPOSITION:  Patient will be hospitalized by Dr. Mccann in guarded condition.     FINAL DIANGOSIS  1. Hypertensive urgency Acute   2. Generalized weakness Acute   3. Chest pain, unspecified type Acute   4. Pulmonary nodule Acute   5. Mediastinal adenopathy Acute   6. Prolonged QT interval Acute   .  The critical care time associated with the care of the patient was 35 minutes.    This dictation has been created using voice recognition software. The accuracy of the dictation is limited by the abilities of the software. I expect there may be some errors of grammar and possibly content. I made every attempt to manually correct the errors within my dictation. However, errors related to voice recognition software may still exist and should be interpreted within the appropriate context.      Azul BLANC (Khoi), am scribing for, and in the presence of, Morena Garcia M.D..    Electronically signed by: Azul Cross (Khoi), 2/15/2024    Morena BLANC M.D. personally performed the services described in this documentation, as scribed by Azul Cross in my presence, and it is both accurate and complete.      The note accurately reflects work and decisions made by me.  Morena Garcia M.D.  2/15/2024  9:07 PM

## 2024-02-15 NOTE — LETTER
February 15, 2024         Patient: Gaetano Boles   YOB: 1985   Date of Visit: 2/15/2024           To Whom it May Concern:    Gaetano Boles was seen in my clinic on 2/15/2024. He may return to work on 2/16/2024.    If you have any questions or concerns, please don't hesitate to call.        Sincerely,           DIRK Otto.  Electronically Signed

## 2024-02-15 NOTE — PROGRESS NOTES
"  Subjective:     Gaetano Boles is a 38 y.o. male who presents for Chest Pain (Started Tuesday with chest pains, unbearable, lower left abdomen, tender in the area, it hurts when he takes a deep breath, dizziness, both legs and arms feel sore, sharp twisting pain towards the left side lung, chest tightness )      Reports \"severe\" chest pain on Tuesday while driving to work, under left breast area. States he had to pull over, and called in to work. Denies anxiety. Was dizzy earlier in the lobby, and felt SOB. \"Heavier breathing\". Not currently SOB.     Chest Pain   This is a new problem. The current episode started in the past 7 days. Associated symptoms include dizziness, malaise/fatigue, palpitations and shortness of breath. Pertinent negatives include no cough, fever or vomiting.   Shortness of Breath  Associated symptoms include chest pain. Pertinent negatives include no ear pain, fever, leg swelling, sore throat or vomiting.   Dizziness  Associated symptoms include chest pain. Pertinent negatives include no coughing, fever, sore throat or vomiting.       No past medical history on file.    No past surgical history on file.    Social History     Socioeconomic History    Marital status: Single     Spouse name: Not on file    Number of children: Not on file    Years of education: Not on file    Highest education level: Not on file   Occupational History    Not on file   Tobacco Use    Smoking status: Not on file    Smokeless tobacco: Not on file   Substance and Sexual Activity    Alcohol use: Not on file    Drug use: Not on file    Sexual activity: Not on file   Other Topics Concern    Not on file   Social History Narrative    Not on file     Social Determinants of Health     Financial Resource Strain: Not on file   Food Insecurity: Not on file   Transportation Needs: Not on file   Physical Activity: Not on file   Stress: Not on file   Social Connections: Not on file   Intimate Partner Violence: Not on " "file   Housing Stability: Not on file        No family history on file.     Not on File    Review of Systems   Constitutional:  Positive for malaise/fatigue. Negative for fever.   HENT:  Negative for ear pain, hearing loss, sore throat and tinnitus.    Respiratory:  Positive for shortness of breath. Negative for cough.    Cardiovascular:  Positive for chest pain and palpitations. Negative for leg swelling.   Gastrointestinal:  Negative for heartburn and vomiting.   Neurological:  Positive for dizziness.   Psychiatric/Behavioral:  The patient is not nervous/anxious.    All other systems reviewed and are negative.       Objective:   BP (!) 158/108 (BP Location: Right arm, Patient Position: Sitting, BP Cuff Size: Large adult long)   Pulse (!) 53   Temp 36.4 °C (97.5 °F) (Temporal)   Resp 16   Ht 1.803 m (5' 11\")   Wt 95 kg (209 lb 6.4 oz)   SpO2 98%   BMI 29.21 kg/m²     Physical Exam  Vitals reviewed.   Constitutional:       General: He is not in acute distress.     Appearance: He is well-developed.   HENT:      Head: Normocephalic and atraumatic.      Right Ear: External ear normal.      Left Ear: External ear normal.      Nose: Nose normal.   Eyes:      Conjunctiva/sclera: Conjunctivae normal.   Cardiovascular:      Rate and Rhythm: Normal rate and regular rhythm.   Pulmonary:      Effort: Pulmonary effort is normal. No respiratory distress.      Breath sounds: Normal breath sounds. No stridor. No wheezing, rhonchi or rales.      Comments: Denies pleuritic like pain with inspiration.   Musculoskeletal:      Cervical back: Neck supple.   Skin:     General: Skin is warm and dry.      Findings: No rash.   Neurological:      General: No focal deficit present.      Mental Status: He is alert and oriented to person, place, and time.      GCS: GCS eye subscore is 4. GCS verbal subscore is 5. GCS motor subscore is 6.   Psychiatric:         Speech: Speech normal.         Behavior: Behavior normal.         Thought " Content: Thought content normal.         Judgment: Judgment normal.         Assessment/Plan:   1. Chest pain, unspecified type  - DX-CHEST-2 VIEWS; Future  - Referral to establish with PCP  -EKG    2. SOB (shortness of breath)  - DX-CHEST-2 VIEWS; Future  - Referral to establish with PCP  -EKG    3. Dizziness  - DX-CHEST-2 VIEWS; Future  - Referral to establish with PCP  -EKG    4. Elevated blood pressure reading  - DX-CHEST-2 VIEWS; Future  - Referral to establish with PCP  -EKG    -Sinus tacycardia, with LVH with early repole on EKG. Reports additional symptoms associated with ACS: dyspnea, weakness, and palpitations. No syncope. No history of nicotine smoking, hyperlipidemia, diabetes, or htn. Unknown family history of coronary artery disease, possibly his grandmother. Discussed concerns for chest pain. Hemodynamically stable. Patient referred to follow up emergently for chest pain. Discussed risks and benefits. Patient agrees with plan.     Differential diagnosis, natural history, supportive care, and indications for immediate follow-up discussed.

## 2024-02-16 ENCOUNTER — APPOINTMENT (OUTPATIENT)
Dept: RADIOLOGY | Facility: MEDICAL CENTER | Age: 39
DRG: 305 | End: 2024-02-16
Attending: STUDENT IN AN ORGANIZED HEALTH CARE EDUCATION/TRAINING PROGRAM
Payer: COMMERCIAL

## 2024-02-16 ENCOUNTER — APPOINTMENT (OUTPATIENT)
Dept: CARDIOLOGY | Facility: MEDICAL CENTER | Age: 39
DRG: 305 | End: 2024-02-16
Attending: STUDENT IN AN ORGANIZED HEALTH CARE EDUCATION/TRAINING PROGRAM
Payer: COMMERCIAL

## 2024-02-16 PROBLEM — I50.20 HFREF (HEART FAILURE WITH REDUCED EJECTION FRACTION) (HCC): Status: ACTIVE | Noted: 2024-02-16

## 2024-02-16 PROBLEM — R73.03 PREDIABETES: Status: ACTIVE | Noted: 2024-02-15

## 2024-02-16 PROBLEM — R91.1 INCIDENTAL LUNG NODULE, > 3MM AND < 8MM: Status: ACTIVE | Noted: 2024-02-16

## 2024-02-16 PROBLEM — I50.21 ACUTE HFREF (HEART FAILURE WITH REDUCED EJECTION FRACTION) (HCC): Status: ACTIVE | Noted: 2024-02-16

## 2024-02-16 LAB
CHOLEST SERPL-MCNC: 197 MG/DL (ref 100–199)
HDLC SERPL-MCNC: 51 MG/DL
LDLC SERPL CALC-MCNC: 130 MG/DL
LV EJECT FRACT  99904: 25
LV EJECT FRACT MOD 2C 99903: 22.54
LV EJECT FRACT MOD 4C 99902: 26.63
LV EJECT FRACT MOD BP 99901: 24.06
NT-PROBNP SERPL IA-MCNC: 4004 PG/ML (ref 0–125)
TRIGL SERPL-MCNC: 81 MG/DL (ref 0–149)
TROPONIN T SERPL-MCNC: 32 NG/L (ref 6–19)

## 2024-02-16 PROCEDURE — 93306 TTE W/DOPPLER COMPLETE: CPT

## 2024-02-16 PROCEDURE — 78452 HT MUSCLE IMAGE SPECT MULT: CPT

## 2024-02-16 PROCEDURE — 93306 TTE W/DOPPLER COMPLETE: CPT | Mod: 26 | Performed by: INTERNAL MEDICINE

## 2024-02-16 PROCEDURE — A9270 NON-COVERED ITEM OR SERVICE: HCPCS | Performed by: STUDENT IN AN ORGANIZED HEALTH CARE EDUCATION/TRAINING PROGRAM

## 2024-02-16 PROCEDURE — 80061 LIPID PANEL: CPT

## 2024-02-16 PROCEDURE — 99233 SBSQ HOSP IP/OBS HIGH 50: CPT | Performed by: INTERNAL MEDICINE

## 2024-02-16 PROCEDURE — 84244 ASSAY OF RENIN: CPT

## 2024-02-16 PROCEDURE — 83880 ASSAY OF NATRIURETIC PEPTIDE: CPT

## 2024-02-16 PROCEDURE — 700111 HCHG RX REV CODE 636 W/ 250 OVERRIDE (IP): Mod: JZ | Performed by: INTERNAL MEDICINE

## 2024-02-16 PROCEDURE — 99222 1ST HOSP IP/OBS MODERATE 55: CPT | Performed by: STUDENT IN AN ORGANIZED HEALTH CARE EDUCATION/TRAINING PROGRAM

## 2024-02-16 PROCEDURE — 700102 HCHG RX REV CODE 250 W/ 637 OVERRIDE(OP): Performed by: STUDENT IN AN ORGANIZED HEALTH CARE EDUCATION/TRAINING PROGRAM

## 2024-02-16 PROCEDURE — 700111 HCHG RX REV CODE 636 W/ 250 OVERRIDE (IP)

## 2024-02-16 PROCEDURE — 700111 HCHG RX REV CODE 636 W/ 250 OVERRIDE (IP): Mod: JZ | Performed by: STUDENT IN AN ORGANIZED HEALTH CARE EDUCATION/TRAINING PROGRAM

## 2024-02-16 PROCEDURE — 770020 HCHG ROOM/CARE - TELE (206)

## 2024-02-16 PROCEDURE — 96376 TX/PRO/DX INJ SAME DRUG ADON: CPT

## 2024-02-16 PROCEDURE — 84484 ASSAY OF TROPONIN QUANT: CPT

## 2024-02-16 RX ORDER — AMINOPHYLLINE 25 MG/ML
100 INJECTION, SOLUTION INTRAVENOUS
Status: COMPLETED | OUTPATIENT
Start: 2024-02-16 | End: 2024-02-16

## 2024-02-16 RX ORDER — AMINOPHYLLINE 25 MG/ML
100 INJECTION, SOLUTION INTRAVENOUS
Status: DISCONTINUED | OUTPATIENT
Start: 2024-02-16 | End: 2024-02-16

## 2024-02-16 RX ORDER — CARVEDILOL 25 MG/1
25 TABLET ORAL 2 TIMES DAILY WITH MEALS
Status: DISCONTINUED | OUTPATIENT
Start: 2024-02-16 | End: 2024-02-17 | Stop reason: HOSPADM

## 2024-02-16 RX ORDER — LOSARTAN POTASSIUM 25 MG/1
25 TABLET ORAL
Status: DISCONTINUED | OUTPATIENT
Start: 2024-02-16 | End: 2024-02-17 | Stop reason: HOSPADM

## 2024-02-16 RX ORDER — REGADENOSON 0.08 MG/ML
INJECTION, SOLUTION INTRAVENOUS
Status: COMPLETED
Start: 2024-02-16 | End: 2024-02-16

## 2024-02-16 RX ORDER — SPIRONOLACTONE 25 MG/1
25 TABLET ORAL
Status: DISCONTINUED | OUTPATIENT
Start: 2024-02-16 | End: 2024-02-17 | Stop reason: HOSPADM

## 2024-02-16 RX ORDER — REGADENOSON 0.08 MG/ML
0.4 INJECTION, SOLUTION INTRAVENOUS ONCE
Status: DISCONTINUED | OUTPATIENT
Start: 2024-02-16 | End: 2024-02-16

## 2024-02-16 RX ORDER — AMINOPHYLLINE 25 MG/ML
INJECTION, SOLUTION INTRAVENOUS
Status: DISPENSED
Start: 2024-02-16 | End: 2024-02-16

## 2024-02-16 RX ORDER — FUROSEMIDE 10 MG/ML
40 INJECTION INTRAMUSCULAR; INTRAVENOUS ONCE
Status: COMPLETED | OUTPATIENT
Start: 2024-02-16 | End: 2024-02-16

## 2024-02-16 RX ORDER — ENOXAPARIN SODIUM 100 MG/ML
40 INJECTION SUBCUTANEOUS DAILY
Status: DISCONTINUED | OUTPATIENT
Start: 2024-02-16 | End: 2024-02-17 | Stop reason: HOSPADM

## 2024-02-16 RX ORDER — REGADENOSON 0.08 MG/ML
0.4 INJECTION, SOLUTION INTRAVENOUS ONCE
Status: ACTIVE | OUTPATIENT
Start: 2024-02-16 | End: 2024-02-17

## 2024-02-16 RX ADMIN — FUROSEMIDE 40 MG: 10 INJECTION, SOLUTION INTRAVENOUS at 15:47

## 2024-02-16 RX ADMIN — CARVEDILOL 25 MG: 12.5 TABLET, FILM COATED ORAL at 17:28

## 2024-02-16 RX ADMIN — AMINOPHYLLINE 100 MG: 25 INJECTION, SOLUTION INTRAVENOUS at 11:13

## 2024-02-16 RX ADMIN — REGADENOSON 0.4 MG: 0.08 INJECTION, SOLUTION INTRAVENOUS at 11:06

## 2024-02-16 RX ADMIN — HYDRALAZINE HYDROCHLORIDE 20 MG: 20 INJECTION, SOLUTION INTRAMUSCULAR; INTRAVENOUS at 12:19

## 2024-02-16 RX ADMIN — LOSARTAN POTASSIUM 25 MG: 50 TABLET, FILM COATED ORAL at 15:47

## 2024-02-16 RX ADMIN — SPIRONOLACTONE 25 MG: 25 TABLET ORAL at 15:47

## 2024-02-16 RX ADMIN — ENOXAPARIN SODIUM 40 MG: 100 INJECTION SUBCUTANEOUS at 17:28

## 2024-02-16 ASSESSMENT — PAIN DESCRIPTION - PAIN TYPE
TYPE: ACUTE PAIN
TYPE: ACUTE PAIN

## 2024-02-16 NOTE — H&P
Hospital Medicine History & Physical Note    Date of Service  2/15/2024    Primary Care Physician  Pcp Pt States None    Consultants  None    Code Status  Full Code    Chief Complaint  Chief Complaint   Patient presents with    Chest Pain     'Sharp / twisting' pain 2 days ago lasting for 4 hours, L chest, associated exhaustion during and afterwards, denies cardiac hx, pt states he has not had chest pain since then, seen in urgent care today and sent here for further workup         History of Presenting Illness  Gaetano Boles is a 38 y.o. male who presented 2/15/2024 with chest pain.  Patient does not follow with doctors regularly.  He occasionally smokes marijuana, denies tobacco alcohol and illicit drug use.  2 days ago, patient began to have a sharp chest pain that persisted throughout the day.  He states that the chest pain resolved yesterday and he went to work, but suddenly developed generalized weakness, dizziness, nausea.  He had 1 episode of nonbloody nonbilious vomiting yesterday.  Today, the patient persisted, his boss sent him to the ER for evaluation.    In ER, patient found to have uncontrolled blood pressure.  EKG showing normal sinus rhythm with left ventricular hypertrophy, and ST depression in inferior leads.  Patient given hydralazine and labetalol in ED.    I discussed the plan of care with patient.    Review of Systems  ROS    Past Medical History   has no past medical history on file.    Surgical History   has no past surgical history on file.     Family History  family history is not on file.   Family history reviewed with patient. There is no family history that is pertinent to the chief complaint.     Social History       Allergies  No Known Allergies    Medications  Prior to Admission Medications   Prescriptions Last Dose Informant Patient Reported? Taking?   aspirin (ASA) 325 MG Tab 2/15/2024 at AM Patient Yes Yes   Sig: Take 650 mg by mouth 2 times a day as needed (Chest  "Pain). 2 tablets = 650 mg.      Facility-Administered Medications: None       Physical Exam  Temp:  [36.2 °C (97.1 °F)-36.4 °C (97.5 °F)] 36.2 °C (97.1 °F)  Pulse:  [] 103  Resp:  [15-24] 23  BP: (158-213)/(108-154) 174/120  SpO2:  [93 %-98 %] 94 %  Blood Pressure: (!) 174/120   Temperature: 36.2 °C (97.1 °F)   Pulse: (!) 103   Respiration: (!) 23   Pulse Oximetry: 94 %       Physical Exam  Constitutional:       Appearance: Normal appearance. He is normal weight.   HENT:      Head: Normocephalic.      Nose: Nose normal.      Mouth/Throat:      Mouth: Mucous membranes are moist.   Eyes:      Pupils: Pupils are equal, round, and reactive to light.   Cardiovascular:      Rate and Rhythm: Normal rate and regular rhythm.      Pulses: Normal pulses.   Pulmonary:      Effort: Pulmonary effort is normal.      Breath sounds: Normal breath sounds.   Abdominal:      General: Abdomen is flat. Bowel sounds are normal.      Palpations: Abdomen is soft.   Musculoskeletal:         General: Normal range of motion.      Cervical back: Neck supple.   Skin:     General: Skin is warm.   Neurological:      General: No focal deficit present.      Mental Status: He is alert and oriented to person, place, and time. Mental status is at baseline.   Psychiatric:         Mood and Affect: Mood normal.         Behavior: Behavior normal.         Thought Content: Thought content normal.         Judgment: Judgment normal.         Laboratory:  Recent Labs     02/15/24  1246   WBC 9.6   RBC 6.42*   HEMOGLOBIN 16.6   HEMATOCRIT 49.6   MCV 77.3*   MCH 25.9*   MCHC 33.5   RDW 38.5   PLATELETCT 291   MPV 9.3     Recent Labs     02/15/24  1246   SODIUM 142   POTASSIUM 4.2   CHLORIDE 108   CO2 20   GLUCOSE 108*   BUN 16   CREATININE 1.39   CALCIUM 8.9     Recent Labs     02/15/24  1246   ALTSGPT 53*   ASTSGOT 38   ALKPHOSPHAT 78   TBILIRUBIN 1.4   GLUCOSE 108*         No results for input(s): \"NTPROBNP\" in the last 72 hours.      Recent Labs     " 02/15/24  1246 02/15/24  1433   TROPONINT 29* 30*       Imaging:  CT-CTA COMPLETE THORACOABDOMINAL AORTA   Final Result      1.  No evidence of aortic aneurysm or dissection.      2.  Enlarged mediastinal and right hilar lymph nodes. Consideration should be given for infectious or neoplastic process.      3.  4 mm pulmonary nodule within the right lower lobe.      4.  Fatty liver.      5.  Mild splenomegaly.      Fleischner Society pulmonary nodule recommendations:   Low Risk: No routine follow-up      High Risk: Optional CT at 12 months      Comments: Nodules less than 6 mm do not require routine follow-up, but certain patients at high risk with suspicious nodule morphology, upper lobe location, or both may warrant 12-month follow-up.      Low Risk - Minimal or absent history of smoking and of other known risk factors.      High Risk - History of smoking or of other known risk factors.      Note: These recommendations do not apply to lung cancer screening, patients with immunosuppression, or patients with known primary cancer.      Fleischner Society 2017 Guidelines for Management of Incidentally Detected Pulmonary Nodules in Adults                           DX-CHEST-PORTABLE (1 VIEW)   Final Result      No acute cardiac or pulmonary abnormalities are identified.          EKG:  I have personally reviewed the images and compared with prior images.    Assessment/Plan:  Justification for Admission Status  I anticipate this patient will require at least two midnights for appropriate medical management, necessitating inpatient admission because patient has hypertensive urgency    Patient will need a Telemetry bed on MEDICAL service .  The need is secondary to hypertensive urgency.    * Hypertensive urgency- (present on admission)  Assessment & Plan  Given 20 mg hydralazine and 20 mg labetalol in ED  Start lisinopril 20 mg po daily  Continue labetalol IV prn  Found to troponin 29 and 30, likely from uncontrolled  hypertension, EKG showing normal sinus rhythm with mild ST depression in inferior leads and LVH  TTE  Stress test in a.m.        Elevated blood sugar  Assessment & Plan  Sugar 108  Check A1c    Elevated troponin  Assessment & Plan  29 and 30, likely from uncontrolled hypertension  Stress test in a.m.  CT angio thoracoabdominal shows no dissection or aneurysm, however does show mediastinal and right-sided hilar enlarged lymph nodes, will need follow-up        VTE prophylaxis: pharmacologic prophylaxis contraindicated due to stress test in a.m.

## 2024-02-16 NOTE — ED NOTES
Pt states 8/10 headache with lightheadedness, denies chest pain at this time. Attending MD notified.

## 2024-02-16 NOTE — ASSESSMENT & PLAN NOTE
- Suspect this is due to longstanding uncontrolled hypertension.    -Pending stress testing, if positive will need further ischemic workup.  -Cardiology consulted, appreciate recommendations.  -Continue Coreg, losartan.  Start Aldactone.  Will need further optimization of guideline-directed medical therapy.  -Not overtly volume overloaded.  Will give 1 dose of IV Lasix 40 mg.  Close intake and output monitoring, daily weights.  -Monitor on telemetry.

## 2024-02-16 NOTE — ASSESSMENT & PLAN NOTE
- Suspect patient has had longstanding uncontrolled blood pressure, now with new HFrEF.  -Optimize blood pressure control.  Continue Coreg, and losartan.  Start Aldactone.  -Further cardiac workup as above.  -Monitor blood pressure trend closely, start as needed IV labetalol for significant symptomatic hypertension.  Optimize blood pressure control.

## 2024-02-16 NOTE — CONSULTS
Reason for Consult:  Asked by Dr Dre Lynne M.D. to see this patient with chest pain and new onset HFrEF  Patient's PCP: Pcp Pt States None    CC:   Chief Complaint   Patient presents with    Chest Pain     'Sharp / twisting' pain 2 days ago lasting for 4 hours, L chest, associated exhaustion during and afterwards, denies cardiac hx, pt states he has not had chest pain since then, seen in urgent care today and sent here for further workup         HPI:  Gaetano Boles is a 38-year-old man with history of hypertension and no other prior cardiac history who presented with chest pain, found to be hypertensive.  Cardiology is consulted for abnormal echocardiogram findings, which showed severely depressed LVEF.    The patient reports feeling well currently.  He denies any current chest pain.  He denies any orthopnea, PND, or leg swelling.  No palpitations.  No syncope or presyncopal episodes.    Of note, the patient reports that he experimented with cocaine many years ago but denies any current drug use.  No meth use.  Had occasional binge drinking back in college, but only moderate use about once a month since.  He is not familiar with his biological family history.       Medications / Drug list prior to admission:  No current facility-administered medications on file prior to encounter.     Current Outpatient Medications on File Prior to Encounter   Medication Sig Dispense Refill    aspirin (ASA) 325 MG Tab Take 650 mg by mouth 2 times a day as needed (Chest Pain). 2 tablets = 650 mg.         Current list of administered Medications:    Current Facility-Administered Medications:     regadenoson (Lexiscan) injection SOLN 0.4 mg, 0.4 mg, Intravenous, Once, Kenny Mccann M.D.    AMINOPHYLLINE 25 MG/ML IV SOLN, , , , , Dose not Required at 02/16/24 1130    losartan (Cozaar) tablet 25 mg, 25 mg, Oral, Q DAY, Gavino Tineo M.D., 25 mg at 02/16/24 1547    spironolactone (Aldactone) tablet 25 mg, 25 mg,  "Oral, Q DAY, Gavino Tineo M.D., 25 mg at 02/16/24 1547    enoxaparin (Lovenox) inj 40 mg, 40 mg, Subcutaneous, DAILY AT 1800, Dre Lynne M.D.    carvedilol (Coreg) tablet 25 mg, 25 mg, Oral, BID WITH MEALS, Gavino Tineo M.D.    acetaminophen (Tylenol) tablet 650 mg, 650 mg, Oral, Q6HRS PRN, Kenny Mccann M.D.    LORazepam (Ativan) injection 2 mg, 2 mg, Intravenous, Q4HRS PRN, Kenny Mccann M.D.    morphine 4 MG/ML injection 4 mg, 4 mg, Intravenous, Q4HRS PRN, Kenny Mccann M.D., 4 mg at 02/15/24 2111    hydrALAZINE (Apresoline) injection 20 mg, 20 mg, Intravenous, Q6HRS PRN, Kenny Mccann M.D., 20 mg at 02/16/24 1219    History reviewed. No pertinent past medical history.    No past surgical history on file.    No family history on file.  Patient family history was personally reviewed, no pertinent family history to current presentation    Social History     Tobacco Use    Smoking status: Never    Smokeless tobacco: Never       ALLERGIES:  No Known Allergies    Review of systems:  A complete review of symptoms was reviewed with patient. This is reviewed in H&P and PMH. ALL OTHERS reviewed and negative    Physical exam:  Patient Vitals for the past 24 hrs:   BP Temp Temp src Pulse Resp SpO2 Height Weight   02/16/24 0651 (!) 151/95 36.7 °C (98.1 °F) Temporal 79 16 93 % -- --   02/16/24 0341 135/86 36.7 °C (98.1 °F) Temporal 77 18 93 % -- --   02/15/24 2235 (!) 160/101 -- -- 97 18 93 % -- --   02/15/24 2234 -- 37.4 °C (99.4 °F) Temporal -- -- -- 1.778 m (5' 10\") 93.7 kg (206 lb 9.1 oz)   02/15/24 2137 (!) 168/110 -- -- 100 16 94 % -- --   02/15/24 2115 (!) 166/114 -- -- 98 20 95 % -- --   02/15/24 2059 (!) 175/129 -- -- 98 -- 93 % -- --   02/15/24 2048 (!) 175/129 -- -- -- -- -- -- --   02/15/24 1944 (!) 177/122 -- -- (!) 104 14 95 % -- --   02/15/24 1934 (!) 174/120 -- -- (!) 103 -- 94 % -- --   02/15/24 1854 (!) 173/121 -- -- (!) 105 (!) 23 95 % -- --   02/15/24 1821 (!) 182/132 -- -- (!) " "104 20 95 % -- --   02/15/24 1751 (!) 188/131 -- -- 100 20 96 % -- --   02/15/24 1718 (!) 187/137 -- -- 97 18 96 % -- --   02/15/24 1649 (!) 199/151 -- -- 94 15 93 % -- --   02/15/24 1619 (!) 191/138 -- -- 92 18 95 % -- --   02/15/24 1610 (!) 191/138 -- -- -- -- -- -- --   02/15/24 1504 (!) 181/146 -- -- 97 18 97 % -- --   02/15/24 1434 (!) 187/151 -- -- 97 20 96 % -- --   02/15/24 1421 (!) 206/154 -- -- -- -- -- -- --   02/15/24 1404 (!) 206/154 -- -- (!) 101 20 93 % -- --   02/15/24 1334 (!) 213/145 -- -- (!) 106 (!) 24 95 % -- --   02/15/24 1244 (!) 199/147 -- -- (!) 101 20 95 % -- --   02/15/24 1217 -- -- -- -- -- -- -- 94.4 kg (208 lb 1.8 oz)   02/15/24 1206 (!) 191/152 36.2 °C (97.1 °F) Temporal (!) 111 16 98 % 1.778 m (5' 10\") --     General: No acute distress.   EYES: no jaundice  HEENT: OP clear   Neck: No bruits No JVD.   CVS: RRR. S1 + S2. No M/R/G  Resp: CTAB. No wheezing or crackles/rhonchi.  Abdomen: Soft, NT, ND,  Skin: Grossly nothing acute no obvious rashes  Neurological: Alert, Moves all extremities, no cranial nerve defects on limited exam  Extremities: Pulse 2+ in b/l LE.  No edema. No cyanosis.       Data:  Laboratory studies personally reviewed by me:  Recent Results (from the past 24 hour(s))   URINALYSIS (UA)    Collection Time: 02/15/24  6:50 PM    Specimen: Urine   Result Value Ref Range    Color Yellow     Character Clear     Specific Gravity 1.026 <1.035    Ph 7.0 5.0 - 8.0    Glucose Negative Negative mg/dL    Ketones Negative Negative mg/dL    Protein 100 (A) Negative mg/dL    Bilirubin Negative Negative    Urobilinogen, Urine 0.2 Negative    Nitrite Negative Negative    Leukocyte Esterase Negative Negative    Occult Blood Negative Negative    Micro Urine Req Microscopic    URINE MICROSCOPIC (W/UA)    Collection Time: 02/15/24  6:50 PM   Result Value Ref Range    WBC 0-2 (A) /hpf    RBC 0-2 (A) /hpf    Bacteria Negative None /hpf    Epithelial Cells Negative /hpf    Hyaline Cast 0-2 " /lpf   TROPONIN    Collection Time: 02/16/24  8:50 AM   Result Value Ref Range    Troponin T 32 (H) 6 - 19 ng/L   proBrain Natriuretic Peptide, NT    Collection Time: 02/16/24  8:50 AM   Result Value Ref Range    NT-proBNP 4004 (H) 0 - 125 pg/mL   EC-ECHOCARDIOGRAM COMPLETE W/O CONT    Collection Time: 02/16/24  8:57 AM   Result Value Ref Range    Eject.Frac. MOD BP 24.06     Eject.Frac. MOD 4C 26.63     Eject.Frac. MOD 2C 22.54     Left Ventrical Ejection Fraction 25        Imaging:  NM-CARDIAC STRESS TEST   Final Result      EC-ECHOCARDIOGRAM COMPLETE W/O CONT   Final Result      CT-CTA COMPLETE THORACOABDOMINAL AORTA   Final Result      1.  No evidence of aortic aneurysm or dissection.      2.  Enlarged mediastinal and right hilar lymph nodes. Consideration should be given for infectious or neoplastic process.      3.  4 mm pulmonary nodule within the right lower lobe.      4.  Fatty liver.      5.  Mild splenomegaly.      Fleischner Society pulmonary nodule recommendations:   Low Risk: No routine follow-up      High Risk: Optional CT at 12 months      Comments: Nodules less than 6 mm do not require routine follow-up, but certain patients at high risk with suspicious nodule morphology, upper lobe location, or both may warrant 12-month follow-up.      Low Risk - Minimal or absent history of smoking and of other known risk factors.      High Risk - History of smoking or of other known risk factors.      Note: These recommendations do not apply to lung cancer screening, patients with immunosuppression, or patients with known primary cancer.      Fleischner Society 2017 Guidelines for Management of Incidentally Detected Pulmonary Nodules in Adults                           DX-CHEST-PORTABLE (1 VIEW)   Final Result      No acute cardiac or pulmonary abnormalities are identified.              EKG 2/15/2024: personally reviewed by me sinus tachycardia    Echocardiogram 2/16/2024  CONCLUSIONS  Severely reduced left  ventricular systolic function.  The left ventricular ejection fraction is visually estimated to be 25%.  The left ventricle is moderately dilated, LVEDD 6.6cm.  Moderately dilated right ventricle.  Reduced right ventricular systolic function with preserved TAPSE of 2.1cm.  Severely dilated >48 mL/sq m.  Mild aortic insufficiency.  Normal estimated right atrial pressure.   Mild pulmonary hypertension, estimated PASP of 35mmHg.   Cannot rule out apical thrombus, consider repeat limited study with contrast if clinically indicated.     No prior study is available for comparison.     Nuclear stress test 2/16/2024   NUCLEAR IMAGING INTERPRETATION   No convincing reversible ischemia.      There is small to moderate fixed perfusion defect at the mid anterior wall.    This could be myocardial scar.      There is global hypokinesis of left ventricle with a very low LVEF of 24%    which is suggestive of dilated cardiomyopathy. Recommend cardiology    consultation and correlation with echocardiogram.    All pertinent features of laboratory and imaging reviewed including primary images where applicable      Principal Problem:    New HFrEF (heart failure with reduced ejection fraction) (HCC) (POA: Yes)  Active Problems:    Hypertensive urgency (POA: Yes)    Elevated troponin (POA: Yes)    Prediabetes (POA: Yes)    Incidental lung nodule, > 3mm and < 8mm (POA: Yes)    HFrEF (heart failure with reduced ejection fraction) (HCC) (POA: Unknown)  Resolved Problems:    * No resolved hospital problems. *      Assessment / Plan:  Chest pain, resolved  New onset HFrEF  Hypertension  Experimented with cocaine many years ago but denies any current drug use.  No meth use.  Had occasional binge drinking back in college, but only moderate use about once a month since. No known family history.  The patient appears euvolemic on exam.  Troponin minimally elevated. Nuclear stress with possible prior MI but no reversible ischemia - can plan to  follow up outpatient and consider outpatient cardiac MRI  -Increase carvedilol to 25mg twice daily for optimal BP control.    -Transition from lisinopril to losartan for easier transition to Entresto outpatient  -Start spironolactone 25 mg daily  -Consider starting Farxiga tomorrow.  -Ambulate prior to discharge tomorrow.    Cardiology will sign off.    I personally discussed his case with  Dr Dre Lynne M.D.    It is my pleasure to participate in the care of Mr. Boles.  Please do not hesitate to contact me with questions or concerns.    Gavino Tineo MD   Cardiologist [facility]    2/16/2024    Please note that this dictation was created using voice recognition software. There may be errors I did not discover before finalizing the note.

## 2024-02-16 NOTE — PROGRESS NOTES
4 Eyes Skin Assessment Completed by NASRA Turpin and NASRA Mejia.    Head WDL  Ears WDL  Nose WDL  Mouth WDL  Neck WDL  Breast/Chest WDL  Shoulder Blades WDL  Spine WDL  (R) Arm/Elbow/Hand WDL  (L) Arm/Elbow/Hand WDL  Abdomen WDL  Groin WDL  Scrotum/Coccyx/Buttocks WDL  (R) Leg WDL  (L) Leg WDL  (R) Heel/Foot/Toe WDL  (L) Heel/Foot/Toe WDL          Devices In Places Tele Box and Pulse Ox      Interventions In Place Pillows    Possible Skin Injury No    Pictures Uploaded Into Epic N/A  Wound Consult Placed N/A  RN Wound Prevention Protocol Ordered No

## 2024-02-16 NOTE — ED NOTES
Bedside report received from off going RN/tech: Jagruti RN, assumed care of patient.  POC discussed with patient. Call light within reach, all needs addressed at this time.       Fall risk interventions in place: Move the patient closer to the nurse's station, Patient's personal possessions are with in their safe reach, Give patient urinal if applicable, and Keep floor surfaces clean and dry (all applicable per Gary Fall risk assessment)   Continuous monitoring: Cardiac Leads, Pulse Ox, or Blood Pressure  IVF/IV medications: Not Applicable   Oxygen: Room Air  Bedside sitter: Not Applicable   Isolation: Not Applicable

## 2024-02-16 NOTE — PROGRESS NOTES
Monitor summary: SR 73-96, TX .16, QRS .12, QT .36, no ectopy per strip from monitor room.

## 2024-02-16 NOTE — ED NOTES
Med rec completed per patient at bedside.  Allergies reviewed with patient. NKDA.  Patient's preferred pharmacy: [facility].    Patient denies using any prescription medications.    Outpatient antibiotics within the last 30 days: NONE.    ANTICOAGULATION: NONE.

## 2024-02-16 NOTE — PROGRESS NOTES
Report received from night shift RN. Patient A&Ox4, in bed resting comfortably. VSS, denies pain, bed in lowest position and locked, call light in reach.

## 2024-02-16 NOTE — CARE PLAN
The patient is Stable - Low risk of patient condition declining or worsening    Shift Goals  Clinical Goals: Stress, Echo, tele monitor  Patient Goals: Comfort, rest  Family Goals: N/A    Progress made toward(s) clinical / shift goals:    Problem: Pain - Standard  Goal: Alleviation of pain or a reduction in pain to the patient’s comfort goal  Outcome: Progressing     Problem: Knowledge Deficit - Standard  Goal: Patient and family/care givers will demonstrate understanding of plan of care, disease process/condition, diagnostic tests and medications  Outcome: Progressing       Patient is not progressing towards the following goals:

## 2024-02-16 NOTE — CARE PLAN
The patient is Stable - Low risk of patient condition declining or worsening    Shift Goals  Clinical Goals: Pain control, NPO for stress, echo, BP control  Patient Goals: Comfort, rest  Family Goals: Not at bedside    Progress made toward(s) clinical / shift goals:      Problem: Pain - Standard  Goal: Alleviation of pain or a reduction in pain to the patient’s comfort goal  Outcome: Progressing     Problem: Knowledge Deficit - Standard  Goal: Patient and family/care givers will demonstrate understanding of plan of care, disease process/condition, diagnostic tests and medications  Outcome: Progressing       Patient is not progressing towards the following goals:

## 2024-02-16 NOTE — ED NOTES
Noted pt has been urinating frequently, emptied total 1200cc from urinal. Pt said last time he had a drink was this morning.  Informed erp , urine sample sent to lab.   at bedside

## 2024-02-16 NOTE — PROGRESS NOTES
Transported pt on tele monitor to unit. Pt ambulated with steady gait to bed. Assumed care of pt. Pt A&Ox4, NAD, VSS on RA. C/o 2/10 headache. Call light in reach. Bed in lowest position. Care of plan discussed with pt with pt agreeing to care of plan. Communication board updated. All questions answered. Assessment completed.

## 2024-02-16 NOTE — PROGRESS NOTES
Hospital Medicine Daily Progress Note    Date of Service  2/16/2024    Chief Complaint  Chest pain    Hospital Course  Gaetano Boles is a 38 y.o. male with no known past medical history, but does not follow doctors regularly for routine medical care, who presented 2/15/2024 with sharp chest pain in the last 2 days.  He also had an episode of generalized weakness, dizziness and nausea with 1 episode of nonbloody nonbilious vomiting.  On evaluation, blood pressure was significantly elevated in the 190s.  EKG showed LVH with ST depressions in the inferior leads.  WBC count was normal.  Electrolytes and renal function were normal.  ALT was 53 with normal AST, alkaline phosphatase, and bilirubin.  Blood glucose was 108.  Troponins were mildly elevated initially at 30 but has remained flat.  CTA of the aorta did not show any evidence of aortic dissection or aneurysm with enlarged mediastinal and right hilar lymph nodes and 4 mm pulmonary nodule within the right lower lobe.  Chest x-ray showed nothing acute.  He was given doses of hydralazine and labetalol, and patient was started on oral lisinopril.  Stress test was pursued.      Interval Problem Update  2/16/2024 - I reviewed the patient's chart. There were no significant overnight events. Remains hemodynamically stable and afebrile.  Her blood pressure trend improved to the 150s.  Stable on RA.  Urine drug screen was positive for cannabinoids.  Troponins remain flat.  Urinalysis did not show any significant pyuria, nitrites or leukocyte esterase.  TSH was normal.  HbA1c 6.1.  NT proBNP 4004.  Stress test is pending.  Echocardiogram showed severely reduced left ventricular systolic function, EF of 25%, with moderately dilated left ventricle, moderately dilated right ventricle, reduced right ventricular systolic function with preserved TAPSE of 2.1 cm and severely dilated greater than 48 mL/m², mild aortic insufficiency, and mild pulmonary hypertension.  I  have personally consulted and discussed with cardiology, Dr. Tineo for further inputs and evaluation.    > I have personally seen and examined the patient today.  He is feeling better.  He is not as tired as yesterday.  Only has mild headaches but much improved.  No chest pain or shortness of breath.  No fevers or chills.    > Patient is at risk for clinical decompensation, and will need close monitoring in the hospital.  Medically necessary services cannot be provided at a lower level of care.  Escalate care to inpatient.      I personally reviewed all lab results mentioned above. Prior medical records from this institution and outside facilities were independently reviewed as noted. I also personally reviewed all ER physician and consultant recommendations and plans as documented above. History was independently obtained by myself. I have discussed this patient's plan of care and discharge plan at IDT rounds today with Case Management, Nursing, Nursing leadership, and other members of the IDT team.    Consultants/Specialty  None    Code Status  Full Code    Disposition  The patient is not medically cleared for discharge to home or a post-acute facility.      Anticipate discharge to home once medically cleared  I have placed the appropriate orders for post-discharge needs.    Review of Systems  ROS     Pertinent positives/negatives as mentioned above.     A complete review of systems was personally done by me. All other systems were negative.       Physical Exam  Temp:  [36.7 °C (98 °F)-37.4 °C (99.4 °F)] 36.7 °C (98 °F)  Pulse:  [] 88  Resp:  [14-23] 16  BP: (135-199)/() 175/118  SpO2:  [93 %-97 %] 94 %    Physical Exam  Vitals reviewed.   Constitutional:       General: He is not in acute distress.     Appearance: Normal appearance. He is not toxic-appearing or diaphoretic.   HENT:      Head: Normocephalic and atraumatic.      Right Ear: External ear normal.      Left Ear: External ear normal.       Mouth/Throat:      Mouth: Mucous membranes are moist.      Pharynx: No oropharyngeal exudate.   Eyes:      General: No scleral icterus.     Extraocular Movements: Extraocular movements intact.      Conjunctiva/sclera: Conjunctivae normal.      Pupils: Pupils are equal, round, and reactive to light.   Cardiovascular:      Rate and Rhythm: Normal rate and regular rhythm.      Heart sounds: Normal heart sounds. No murmur heard.     No gallop.   Pulmonary:      Effort: Pulmonary effort is normal. No respiratory distress.      Breath sounds: Normal breath sounds. No stridor. No wheezing, rhonchi or rales.   Chest:      Chest wall: No tenderness.   Abdominal:      General: Bowel sounds are normal. There is no distension.      Palpations: Abdomen is soft. There is no mass.      Tenderness: There is no abdominal tenderness. There is no guarding or rebound.   Musculoskeletal:         General: No swelling. Normal range of motion.      Cervical back: Normal range of motion and neck supple.      Right lower leg: No edema.      Left lower leg: No edema.   Lymphadenopathy:      Cervical: No cervical adenopathy.   Skin:     General: Skin is warm and dry.      Coloration: Skin is not jaundiced.      Findings: No rash.   Neurological:      General: No focal deficit present.      Mental Status: He is alert and oriented to person, place, and time.      Cranial Nerves: No cranial nerve deficit.   Psychiatric:         Mood and Affect: Mood normal.         Behavior: Behavior normal.         Thought Content: Thought content normal.         Judgment: Judgment normal.         Fluids  No intake or output data in the 24 hours ending 02/16/24 1500    Laboratory  Recent Labs     02/15/24  1246   WBC 9.6   RBC 6.42*   HEMOGLOBIN 16.6   HEMATOCRIT 49.6   MCV 77.3*   MCH 25.9*   MCHC 33.5   RDW 38.5   PLATELETCT 291   MPV 9.3     Recent Labs     02/15/24  1246   SODIUM 142   POTASSIUM 4.2   CHLORIDE 108   CO2 20   GLUCOSE 108*   BUN 16    CREATININE 1.39   CALCIUM 8.9                   Imaging  NM-CARDIAC STRESS TEST         EC-ECHOCARDIOGRAM COMPLETE W/O CONT   Final Result      CT-CTA COMPLETE THORACOABDOMINAL AORTA   Final Result      1.  No evidence of aortic aneurysm or dissection.      2.  Enlarged mediastinal and right hilar lymph nodes. Consideration should be given for infectious or neoplastic process.      3.  4 mm pulmonary nodule within the right lower lobe.      4.  Fatty liver.      5.  Mild splenomegaly.      Fleischner Society pulmonary nodule recommendations:   Low Risk: No routine follow-up      High Risk: Optional CT at 12 months      Comments: Nodules less than 6 mm do not require routine follow-up, but certain patients at high risk with suspicious nodule morphology, upper lobe location, or both may warrant 12-month follow-up.      Low Risk - Minimal or absent history of smoking and of other known risk factors.      High Risk - History of smoking or of other known risk factors.      Note: These recommendations do not apply to lung cancer screening, patients with immunosuppression, or patients with known primary cancer.      Fleischner Society 2017 Guidelines for Management of Incidentally Detected Pulmonary Nodules in Adults                           DX-CHEST-PORTABLE (1 VIEW)   Final Result      No acute cardiac or pulmonary abnormalities are identified.           Assessment/Plan  * New HFrEF (heart failure with reduced ejection fraction) (HCC)- (present on admission)  Assessment & Plan  - Suspect this is due to longstanding uncontrolled hypertension.    -Pending stress testing, if positive will need further ischemic workup.  -Cardiology consulted, appreciate recommendations.  -Continue Coreg, losartan.  Start Aldactone.  Will need further optimization of guideline-directed medical therapy.  -Not overtly volume overloaded.  Will give 1 dose of IV Lasix 40 mg.  Close intake and output monitoring, daily weights.  -Monitor on  telemetry.    Hypertensive urgency- (present on admission)  Assessment & Plan  - Suspect patient has had longstanding uncontrolled blood pressure, now with new HFrEF.  -Optimize blood pressure control.  Continue Coreg, and losartan.  Start Aldactone.  -Further cardiac workup as above.  -Monitor blood pressure trend closely, start as needed IV labetalol for significant symptomatic hypertension.  Optimize blood pressure control.      Incidental lung nodule, > 3mm and < 8mm- (present on admission)  Assessment & Plan  - He will be referred to outpatient pulmonology lung nodule clinic for further evaluation.    Elevated troponin- (present on admission)  Assessment & Plan  -Likely from uncontrolled hypertension and new HFrEF.  -Awaiting stress testing to evaluate if will need further ischemic workup.  -Monitor on telemetry.  -Continue Coreg.    -Check lipid profile.    Prediabetes- (present on admission)  Assessment & Plan  -HbA1c 6.1.  Monitor blood glucose trend closely.  -Counseled on lifestyle modification.         VTE prophylaxis:    enoxaparin ppx      My total time spent caring for the patient on the day of the encounter was 52 minutes. This does not include time spent on separately billable procedures/tests.

## 2024-02-16 NOTE — ASSESSMENT & PLAN NOTE
-Likely from uncontrolled hypertension and new HFrEF.  -Awaiting stress testing to evaluate if will need further ischemic workup.  -Monitor on telemetry.  -Continue Coreg.    -Check lipid profile.

## 2024-02-17 ENCOUNTER — APPOINTMENT (OUTPATIENT)
Dept: RADIOLOGY | Facility: MEDICAL CENTER | Age: 39
DRG: 305 | End: 2024-02-17
Payer: COMMERCIAL

## 2024-02-17 ENCOUNTER — PHARMACY VISIT (OUTPATIENT)
Dept: PHARMACY | Facility: MEDICAL CENTER | Age: 39
End: 2024-02-17
Payer: COMMERCIAL

## 2024-02-17 VITALS
BODY MASS INDEX: 30.93 KG/M2 | SYSTOLIC BLOOD PRESSURE: 143 MMHG | TEMPERATURE: 97.9 F | WEIGHT: 216.05 LBS | HEIGHT: 70 IN | OXYGEN SATURATION: 93 % | HEART RATE: 67 BPM | DIASTOLIC BLOOD PRESSURE: 104 MMHG | RESPIRATION RATE: 18 BRPM

## 2024-02-17 PROBLEM — R79.89 ELEVATED TROPONIN: Status: RESOLVED | Noted: 2024-02-15 | Resolved: 2024-02-17

## 2024-02-17 LAB
ANION GAP SERPL CALC-SCNC: 16 MMOL/L (ref 7–16)
BUN SERPL-MCNC: 18 MG/DL (ref 8–22)
CALCIUM SERPL-MCNC: 9.1 MG/DL (ref 8.5–10.5)
CHLORIDE SERPL-SCNC: 102 MMOL/L (ref 96–112)
CO2 SERPL-SCNC: 18 MMOL/L (ref 20–33)
CREAT SERPL-MCNC: 1.45 MG/DL (ref 0.5–1.4)
EKG IMPRESSION: NORMAL
ERYTHROCYTE [DISTWIDTH] IN BLOOD BY AUTOMATED COUNT: 38.4 FL (ref 35.9–50)
GFR SERPLBLD CREATININE-BSD FMLA CKD-EPI: 64 ML/MIN/1.73 M 2
GLUCOSE SERPL-MCNC: 93 MG/DL (ref 65–99)
HCT VFR BLD AUTO: 51.8 % (ref 42–52)
HGB BLD-MCNC: 17 G/DL (ref 14–18)
MCH RBC QN AUTO: 25.3 PG (ref 27–33)
MCHC RBC AUTO-ENTMCNC: 32.8 G/DL (ref 32.3–36.5)
MCV RBC AUTO: 77.2 FL (ref 81.4–97.8)
PLATELET # BLD AUTO: 286 K/UL (ref 164–446)
PMV BLD AUTO: 9.4 FL (ref 9–12.9)
POTASSIUM SERPL-SCNC: 3.9 MMOL/L (ref 3.6–5.5)
RBC # BLD AUTO: 6.71 M/UL (ref 4.7–6.1)
SODIUM SERPL-SCNC: 136 MMOL/L (ref 135–145)
WBC # BLD AUTO: 9.6 K/UL (ref 4.8–10.8)

## 2024-02-17 PROCEDURE — 85027 COMPLETE CBC AUTOMATED: CPT

## 2024-02-17 PROCEDURE — 80048 BASIC METABOLIC PNL TOTAL CA: CPT

## 2024-02-17 PROCEDURE — A9270 NON-COVERED ITEM OR SERVICE: HCPCS | Performed by: STUDENT IN AN ORGANIZED HEALTH CARE EDUCATION/TRAINING PROGRAM

## 2024-02-17 PROCEDURE — 99239 HOSP IP/OBS DSCHRG MGMT >30: CPT | Performed by: STUDENT IN AN ORGANIZED HEALTH CARE EDUCATION/TRAINING PROGRAM

## 2024-02-17 PROCEDURE — 700102 HCHG RX REV CODE 250 W/ 637 OVERRIDE(OP): Performed by: STUDENT IN AN ORGANIZED HEALTH CARE EDUCATION/TRAINING PROGRAM

## 2024-02-17 PROCEDURE — 93010 ELECTROCARDIOGRAM REPORT: CPT | Performed by: STUDENT IN AN ORGANIZED HEALTH CARE EDUCATION/TRAINING PROGRAM

## 2024-02-17 PROCEDURE — RXMED WILLOW AMBULATORY MEDICATION CHARGE: Performed by: STUDENT IN AN ORGANIZED HEALTH CARE EDUCATION/TRAINING PROGRAM

## 2024-02-17 PROCEDURE — 71045 X-RAY EXAM CHEST 1 VIEW: CPT

## 2024-02-17 PROCEDURE — 93005 ELECTROCARDIOGRAM TRACING: CPT | Performed by: INTERNAL MEDICINE

## 2024-02-17 RX ORDER — SPIRONOLACTONE 25 MG/1
25 TABLET ORAL DAILY
Qty: 30 TABLET | Refills: 3 | Status: SHIPPED | OUTPATIENT
Start: 2024-02-18

## 2024-02-17 RX ORDER — CARVEDILOL 25 MG/1
25 TABLET ORAL 2 TIMES DAILY WITH MEALS
Qty: 60 TABLET | Refills: 3 | Status: SHIPPED | OUTPATIENT
Start: 2024-02-17

## 2024-02-17 RX ORDER — DAPAGLIFLOZIN 10 MG/1
10 TABLET, FILM COATED ORAL DAILY
Qty: 30 TABLET | Refills: 3 | Status: SHIPPED | OUTPATIENT
Start: 2024-02-17

## 2024-02-17 RX ORDER — LOSARTAN POTASSIUM 25 MG/1
25 TABLET ORAL DAILY
Qty: 30 TABLET | Refills: 3 | Status: SHIPPED | OUTPATIENT
Start: 2024-02-18

## 2024-02-17 RX ADMIN — ACETAMINOPHEN 650 MG: 325 TABLET, FILM COATED ORAL at 04:55

## 2024-02-17 RX ADMIN — SPIRONOLACTONE 25 MG: 25 TABLET ORAL at 04:53

## 2024-02-17 RX ADMIN — LOSARTAN POTASSIUM 25 MG: 50 TABLET, FILM COATED ORAL at 04:52

## 2024-02-17 RX ADMIN — CARVEDILOL 25 MG: 12.5 TABLET, FILM COATED ORAL at 08:42

## 2024-02-17 ASSESSMENT — FIBROSIS 4 INDEX: FIB4 SCORE: 0.65

## 2024-02-17 ASSESSMENT — PAIN DESCRIPTION - PAIN TYPE: TYPE: ACUTE PAIN

## 2024-02-17 NOTE — HOSPITAL COURSE
Gaetano Boles is a 38 y.o. male with no known past medical history, but does not follow doctors regularly for routine medical care, who presented 2/15/2024 with sharp chest pain in the last 2 days.  He also had an episode of generalized weakness, dizziness and nausea with 1 episode of nonbloody nonbilious vomiting.      On evaluation, blood pressure was significantly elevated in the 190s.  EKG showed LVH with ST depressions in the inferior leads. Echocardiogram showed severely reduced left ventricular systolic function, EF of 25%, with moderately dilated left ventricle, moderately dilated right ventricle, reduced right ventricular systolic function with preserved and severely dilated greater than 48 mL/m², mild aortic insufficiency, and mild pulmonary hypertension. Stress without evidence of reversible ischemia.     Patient was started on medical management for his congestive heart failure.  I [facility] to [facility].  They were delivered to his bedside prior to discharge.  He is going to follow-up with primary care this week, he needs to establish a new primary care.  Additionally, he will need to follow-up with cardiology in 1 to 2 weeks.      Prior to discharge I had a long conversation with patient and his parents at bedside regarding this new diagnosis.  This has been an incredibly challenging few days.  Patient is very fearful for his overall health.  He is looking forward to his upcoming appointments.

## 2024-02-17 NOTE — CARE PLAN
The patient is Stable - Low risk of patient condition declining or worsening    Shift Goals  Clinical Goals: monitor VSS, monitor labs, rest  Patient Goals: rest, comfort  Family Goals: NA    Progress made toward(s) clinical / shift goals:      Problem: Knowledge Deficit - Standard  Goal: Patient and family/care givers will demonstrate understanding of plan of care, disease process/condition, diagnostic tests and medications  Outcome: Progressing     Problem: Communication  Goal: The ability to communicate needs accurately and effectively will improve  Outcome: Progressing     Problem: Hemodynamics  Goal: Patient's hemodynamics, fluid balance and neurologic status will be stable or improve  Outcome: Progressing     Problem: Infection - Standard  Goal: Patient will remain free from infection  Outcome: Progressing     Problem: Care Map:  Admission Optimal Outcome for the Heart Failure Patient  Goal: Admission:  Optimal Care of the heart failure patient  Outcome: Progressing     Problem: Care Map:  Day 1 Optimal Outcome for the Heart Failure Patient  Goal: Day 1:  Optimal Care of the heart failure patient  Outcome: Progressing     Patient is not progressing towards the following goals:

## 2024-02-17 NOTE — PROGRESS NOTES
"NOC HOSPITALIST CROSS COVER    Notified by RN regarding patient complaining of shortness of breath and \"panic.\"  Per nursing report, no evidence of increased work of breathing.  His oxygen is 96% on room air.  Chest x-ray without evidence of significant acute cardiopulmonary processes (official read pending).  Chest tightness and shortness of breath improved rapidly without intervention.      Vitals:    02/17/24 0600   BP:    Pulse: 69   Resp:    Temp: 36.5 °C (97.7 °F)   SpO2: 96% on room air        -----------------------------------------------------------------------------------------------------------    Electronically signed by:  eTmo Mcfarland, NAHED, APRN, SAQIB-BC  Hospitalist Services         "

## 2024-02-17 NOTE — PROGRESS NOTES
Bedside report received, assumed care of patient. Resting in bed, denied pain. A&O x4. Tele monitoring in place. Educated on fall risk, all fall precautions in place. Call light within reach, bed locked and in lowest position, denied other needs at this time. Hourly rounding in place

## 2024-02-17 NOTE — DISCHARGE INSTRUCTIONS
Follow-up with cardiology in 1 week.  Follow-up with primary care in 1 week.  HF Patient Discharge Instructions  Monitor your weight daily, and maintain a weight chart, to track your weight changes.   Activity as tolerated, unless your Doctor has ordered otherwise. Other activity order: as tolerated.  Follow a low fat, low cholesterol, low salt diet unless instructed otherwise by your Doctor. Read the labels on the back of food products and track your intake of fat, cholesterol and salt.   Fluid Restriction No. If a Fluid Restriction has been ordered by your Doctor, measure fluids with a measuring cup to ensure that you are not exceeding the restriction.   No smoking.  Oxygen No. If your Doctor has ordered that you wear Oxygen at home, it is important to wear it as ordered.  Did you receive an explanation from staff on the importance of taking each of your medications and why it is necessary to keep taking them unless your doctor says to stop? Yes  Were all of your questions answered about how to manage your heart failure and what to do if you have increased signs and symptoms after you go home? Yes  Do you feel like your heart failure care team involved you in the care treatment plan and allowed you to make decisions regarding your care while in the hospital and addressed any discharge needs you might have? Yes

## 2024-02-17 NOTE — PROGRESS NOTES
4 Eyes Skin Assessment Completed by NASRA Guerrero and NASRA Le.    Head WDL  Ears WDL  Nose WDL  Mouth WDL  Neck WDL  Breast/Chest WDL  Shoulder Blades WDL  Spine WDL  (R) Arm/Elbow/Hand WDL  (L) Arm/Elbow/Hand WDL  Abdomen WDL  Groin WDL  Scrotum/Coccyx/Buttocks WDL  (R) Leg WDL  (L) Leg WDL  (R) Heel/Foot/Toe WDL  (L) Heel/Foot/Toe WDL          Devices In Places Tele Box and Pulse Ox      Interventions In Place Pressure Redistribution Mattress    Possible Skin Injury No    Pictures Uploaded Into Epic N/A  Wound Consult Placed N/A  RN Wound Prevention Protocol Ordered No

## 2024-02-17 NOTE — PROGRESS NOTES
Patient walked around unit x2 with this RN. Patients HR remained stable in the 80s-90s per monitor tech. Patient with no complaints of SOB, dizziness, or chest pain. Tolerated walking very well.

## 2024-02-17 NOTE — PROGRESS NOTES
Discharge orders received.  Patient arrived to the discharge lounge.  PIV removed by bedside RN.  No home medications.  Instructions given, medications reviewed and general discharge education provided to patient.  Follow up appointments discussed.  Patient verbalized understanding of dc instructions and prescriptions.  Patient signed discharge instructions.  Patient verbalized he had all belongings with him.  Patient waiting in the discharge lounge for meds to beds medications.   Wished patient a speedy recovery.

## 2024-02-18 LAB — RENIN PLAS-CCNC: 0.5 NG/ML/HR

## 2024-02-21 ENCOUNTER — OFFICE VISIT (OUTPATIENT)
Dept: MEDICAL GROUP | Facility: MEDICAL CENTER | Age: 39
End: 2024-02-21
Payer: COMMERCIAL

## 2024-02-21 VITALS
TEMPERATURE: 96.7 F | SYSTOLIC BLOOD PRESSURE: 116 MMHG | BODY MASS INDEX: 28.77 KG/M2 | DIASTOLIC BLOOD PRESSURE: 60 MMHG | HEART RATE: 79 BPM | HEIGHT: 70 IN | OXYGEN SATURATION: 97 % | WEIGHT: 201 LBS

## 2024-02-21 DIAGNOSIS — Z23 NEED FOR VACCINATION: ICD-10-CM

## 2024-02-21 DIAGNOSIS — E66.3 OVERWEIGHT (BMI 25.0-29.9): ICD-10-CM

## 2024-02-21 DIAGNOSIS — I10 PRIMARY HYPERTENSION: ICD-10-CM

## 2024-02-21 DIAGNOSIS — I50.20 HFREF (HEART FAILURE WITH REDUCED EJECTION FRACTION) (HCC): ICD-10-CM

## 2024-02-21 DIAGNOSIS — R80.1 PERSISTENT PROTEINURIA: ICD-10-CM

## 2024-02-21 DIAGNOSIS — R73.03 PREDIABETES: ICD-10-CM

## 2024-02-21 PROBLEM — I50.21 ACUTE HFREF (HEART FAILURE WITH REDUCED EJECTION FRACTION) (HCC): Status: RESOLVED | Noted: 2024-02-16 | Resolved: 2024-02-21

## 2024-02-21 PROCEDURE — 90471 IMMUNIZATION ADMIN: CPT | Performed by: FAMILY MEDICINE

## 2024-02-21 PROCEDURE — 90715 TDAP VACCINE 7 YRS/> IM: CPT | Performed by: FAMILY MEDICINE

## 2024-02-21 PROCEDURE — 99214 OFFICE O/P EST MOD 30 MIN: CPT | Mod: 25 | Performed by: FAMILY MEDICINE

## 2024-02-21 PROCEDURE — 3078F DIAST BP <80 MM HG: CPT | Performed by: FAMILY MEDICINE

## 2024-02-21 PROCEDURE — 3074F SYST BP LT 130 MM HG: CPT | Performed by: FAMILY MEDICINE

## 2024-02-21 ASSESSMENT — FIBROSIS 4 INDEX: FIB4 SCORE: 0.66

## 2024-02-21 NOTE — PROGRESS NOTES
Verbal consent was acquired by the patient to use [employer] ambient listening note generation during this visit     Patient is a 38 y.o.male. new patient who presents today to discuss new diagnosis of heart failure    History of Present Illness  The patient is a 38-year-old male who presents for a new patient visit and a hospital follow-up.    Last Tuesday, he was driving to work and had a sudden sharp, twisting pain in his chest. He thought it was his lung because it felt like it was behind his heart. He pulled over and calmed down. He called his work to tell them he was not coming in. He drove back home to calm down. It started at 6:30 AM and started to calm down around 10:00 AM. He took a COVID-19 test, which was negative. He fell asleep and woke up and ate dinner. He woke up for the next day for work and was very exhausted and had numbness in his legs and arms. The next day, on Thursday, he was guilted by family and friends at work to see a medical doctor. He went to the urgent care where they did an EKG and told him to immediately go to the ER. He was taken to the ER immediately. He was feeling normal up until that time. His family told him that he has always been a tired person. He had some similar issues in 2021. He saw a nephrologist a few years ago for protein in his urine, who told him that it may be genetic. They did urine test and an ultrasound. He denies any joint pain or bone pain. He lost a few pounds after his hospital visit. He has been trying a diet to lose weight. In 07/2023, he was 220 to 230 pounds. He went to a wedding and thought he should start dieting. He feels a lot better with eating less and cooking at home. His family told him that his face was swollen. Previously only felt short of breath when he is hiking up a hill or working really hard. Now he has been getting winded walking around the block. He has a follow-up with cardiology on Thursday. He would like to get back to normal. He is  "a little sad that he is going through this. His family told him to take a week off to make sure he does not get overwhelmed. He works in sales.  He denies any issues with his medications. He has lightheadedness when he stands up. When he turns too quickly or gets out of a chair or bed real fast, he gets lightheaded. He has to sit down for 5 to 10 minutes. It is getting a little better. He is not aware of having high blood pressure in the past.       Current Outpatient Medications   Medication Sig Refill Last Dispense    carvedilol (COREG) 25 MG Tab Take 1 Tablet by mouth 2 times a day with meals. 3 2/17/2024    dapagliflozin propanediol (FARXIGA) 10 MG Tab Take 1 Tablet by mouth every day. 3 2/17/2024    losartan (COZAAR) 25 MG Tab Take 1 Tablet by mouth every day. 3 2/17/2024    spironolactone (ALDACTONE) 25 MG Tab Take 1 Tablet by mouth every day. 3 2/17/2024      No Known Allergies     Past Medical History:   Diagnosis Date    Arm fracture, left     New HFrEF (heart failure with reduced ejection fraction) (HCC)       Past Surgical History:   Procedure Laterality Date    HEMORRHOIDECTOMY      age 4      Family History   Problem Relation Age of Onset    Diabetes Maternal Uncle     Liver Cancer Maternal Uncle     Hypertension Maternal Grandmother     Hypertension Other         cousin      Social History     Socioeconomic History    Marital status: Single   Tobacco Use    Smoking status: Never    Smokeless tobacco: Never   Vaping Use    Vaping Use: Some days    Substances: THC    Devices: Disposable   Substance and Sexual Activity    Alcohol use: Yes     Comment: 1-2 times a month maybe a 1-2 drinks    Drug use: Yes     Types: Marijuana, Cocaine     Comment: past cocain use a few times college age    Sexual activity: Not Currently     Partners: Female          /60   Pulse 79   Temp 35.9 °C (96.7 °F) (Temporal)   Ht 1.778 m (5' 10\")   Wt 91.2 kg (201 lb)   SpO2 97% , Body mass index is 28.84 " kg/m².    Physical Exam  Vitals reviewed.   Constitutional:       General: He is not in acute distress.     Appearance: Normal appearance.   HENT:      Head: Normocephalic and atraumatic.   Cardiovascular:      Rate and Rhythm: Normal rate and regular rhythm.      Heart sounds: Normal heart sounds.   Pulmonary:      Effort: Pulmonary effort is normal. No respiratory distress.      Breath sounds: Normal breath sounds.   Musculoskeletal:      Right lower leg: No edema.      Left lower leg: No edema.   Skin:     General: Skin is warm and dry.   Neurological:      Mental Status: He is alert. Mental status is at baseline.      Gait: Gait normal.   Psychiatric:         Mood and Affect: Mood normal.         Behavior: Behavior normal.            Results  Laboratory Studies  Labs were reviewed with the patient.          Imaging  Echocardiogram 2/15/24  CONCLUSIONS  Severely reduced left ventricular systolic function.  The left ventricular ejection fraction is visually estimated to be 25%.  The left ventricle is moderately dilated, LVEDD 6.6cm.  Moderately dilated right ventricle.  Reduced right ventricular systolic function with preserved TAPSE of   2.1cm.  Severely dilated >48 mL/sq m.  Mild aortic insufficiency.  Normal estimated right atrial pressure.   Mild pulmonary hypertension, estimated PASP of 35mmHg.   Cannot rule out apical thrombus, consider repeat limited study with   contrast if clinically indicated.    Cardiac Stress Test 2/15/24  NUCLEAR IMAGING INTERPRETATION   No convincing reversible ischemia.      There is small to moderate fixed perfusion defect at the mid anterior wall.    This could be myocardial scar.      There is global hypokinesis of left ventricle with a very low LVEF of 24%    which is suggestive of dilated cardiomyopathy. Recommend cardiology    consultation and correlation with echocardiogram.   ECG INTERPRETATION   Negative stress ECG for ischemia.        Assessment & Plan    1. HFrEF (heart  failure with reduced ejection fraction) (HCC)  New onset diagnosed at hospital. This is a big transition for him. We discussed practicing self-kindness and giving himself time to rest. I am glad he took some time off work to recover. Educated patient on lightheadedness could be from decreased cardiac out put and also side effect of his medication. Does seem to be improving. Will have him continue to monitor and discuss with cardiology. I am uncertain if proteinuria is related to heart failure. Will continue work up.  Patient to continue current medication as directed: coreg 25 mg BID, Farxiga 10 mg, Losartan 25 mg, Spironolactone 25 mg.  - US-RENAL ARTERY DUPLEX COMP; Future    2. Primary hypertension  This is a chronic condition, controlled.  Blood pressure is at goal under 140/90 in the office today.  We will continue the current regimen. coreg 25 mg BID, Farxiga 10 mg, Losartan 25 mg, Spironolactone 25 mg.  - US-RENAL ARTERY DUPLEX COMP; Future    3. Prediabetes  Chronic, Diet controlled. Patient's most recent A1C is 6.1. He is now on a SGLT2 as well as making healthy changes to his diet. I do not think adding a new diabetic medication to his life is necessary. Plan to recheck A1C in about 3 months.    4. Persistent proteinuria  Work up for this started in 2021 but patient was lost to follow up. Plan to start on this again as he continues to have some protein in his urine during hospitalization. He is now on an ARB which may help.  - SPEP W/REFLEX TO WAGNER, A, G, M; Future  - US-RENAL ARTERY DUPLEX COMP; Future  - PROTEIN/CREAT RATIO URINE; Future    5. Overweight (BMI 25.0-29.9)  BMI 28.8, he reports this is an improvement as he has had intentional weight loss of about 20 -25 pounds prior to hospital event. He has lost a bit more since then.    6. Need for vaccination  - Tdap Vaccine =>6YO IM       Return in about 6 weeks (around 4/3/2024), or if symptoms worsen or fail to improve, for Imaging F/U, Lab F/U,  Medication F/U.     This note was created using voice recognition software ([employer]agon). The accuracy of the dictation is limited by the abilities of the software. I have reviewed the note prior to signing, however some errors in grammar and context are still possible. If you have any questions related to this note please do not hesitate to contact our office.

## 2024-02-29 ENCOUNTER — OFFICE VISIT (OUTPATIENT)
Dept: CARDIOLOGY | Facility: MEDICAL CENTER | Age: 39
End: 2024-02-29
Attending: NURSE PRACTITIONER
Payer: COMMERCIAL

## 2024-02-29 VITALS
OXYGEN SATURATION: 97 % | WEIGHT: 206 LBS | HEART RATE: 64 BPM | SYSTOLIC BLOOD PRESSURE: 102 MMHG | BODY MASS INDEX: 29.49 KG/M2 | HEIGHT: 70 IN | DIASTOLIC BLOOD PRESSURE: 68 MMHG | RESPIRATION RATE: 14 BRPM

## 2024-02-29 DIAGNOSIS — I50.20 ACC/AHA STAGE C HEART FAILURE WITH REDUCED EJECTION FRACTION (HCC): ICD-10-CM

## 2024-02-29 DIAGNOSIS — I10 PRIMARY HYPERTENSION: ICD-10-CM

## 2024-02-29 DIAGNOSIS — I50.22 CHRONIC SYSTOLIC CONGESTIVE HEART FAILURE, NYHA CLASS 2 (HCC): ICD-10-CM

## 2024-02-29 DIAGNOSIS — R06.02 SHORTNESS OF BREATH: ICD-10-CM

## 2024-02-29 PROCEDURE — 94618 PULMONARY STRESS TESTING: CPT | Mod: 26 | Performed by: NURSE PRACTITIONER

## 2024-02-29 PROCEDURE — 99214 OFFICE O/P EST MOD 30 MIN: CPT | Performed by: NURSE PRACTITIONER

## 2024-02-29 PROCEDURE — 3078F DIAST BP <80 MM HG: CPT | Performed by: NURSE PRACTITIONER

## 2024-02-29 PROCEDURE — 3074F SYST BP LT 130 MM HG: CPT | Performed by: NURSE PRACTITIONER

## 2024-02-29 PROCEDURE — 99214 OFFICE O/P EST MOD 30 MIN: CPT | Mod: 25 | Performed by: NURSE PRACTITIONER

## 2024-02-29 PROCEDURE — 94618 PULMONARY STRESS TESTING: CPT | Performed by: NURSE PRACTITIONER

## 2024-02-29 ASSESSMENT — MINNESOTA LIVING WITH HEART FAILURE QUESTIONNAIRE (MLHF)
MAKING YOU STAY IN A HOSPITAL: 5
GIVING YOU SIDE EFFECTS FROM TREATMENTS: 4
TOTAL_SCORE: 52
DIFFICULTY GOING AWAY FROM HOME: 3
DIFFICULTY WITH RECREATIONAL PASTIMES, SPORTS, HOBBIES: 3
LOSS OF SELF CONTROL IN YOUR LIFE: 1
FEELING LIKE A BURDEN TO FAMILY AND FRIENDS: 0
MAKING YOU FEEL DEPRESSED: 3
MAKING YOU WORRY: 4
DIFFICULTY WITH SEXUAL ACTIVITIES: 0
WORKING AROUND THE HOUSE OR YARD DIFFICULT: 4
DIFFICULTY TO CONCENTRATE OR REMEMBERING THINGS: 1
DIFFICULTY SOCIALIZING WITH FAMILY OR FRIENDS: 3
COSTING YOU MONEY FOR MEDICAL CARE: 4
SWELLING IN ANKLES OR LEGS: 0
HAVING TO SIT OR LIE DOWN DURING THE DAY: 3
TIRED, FATIGUED OR LOW ON ENERGY: 4
DIFFICULTY SLEEPING WELL AT NIGHT: 2
DIFFICULTY WORKING TO EARN A LIVING: 2
MAKING YOU SHORT OF BREATH: 1
WALKING ABOUT OR CLIMBING STAIRS DIFFICULT: 3
EATING LESS FOODS YOU LIKE: 2

## 2024-02-29 ASSESSMENT — 6 MINUTE WALK TEST (6MWT): TOTAL DISTANCE WALKED (METERS): 313.94

## 2024-02-29 ASSESSMENT — ENCOUNTER SYMPTOMS
CONSTIPATION: 1
MYALGIAS: 0
CLAUDICATION: 0
SHORTNESS OF BREATH: 1
PND: 0
BLURRED VISION: 1
ORTHOPNEA: 0
FEVER: 0
ABDOMINAL PAIN: 0
DIZZINESS: 1
COUGH: 0
PALPITATIONS: 0

## 2024-02-29 ASSESSMENT — FIBROSIS 4 INDEX: FIB4 SCORE: 0.66

## 2024-02-29 NOTE — PROGRESS NOTES
Chief Complaint   Patient presents with    New Patient     DX: New HFrEF (heart failure with reduced ejection fraction) (HCC)        Subjective     Gaetano Boles is a 38 y.o. male who presents today for follow up on his heart failure.    New patient of the office.  Patient had a recent hospitalization from 2/15/2024 through 2024.  He presented with chest pain.  He also had generalized weakness, dizziness and nausea/vomiting.    Patient was found to have elevated blood pressure, EKG showed LVH with ST depression in inferior leads.  Patient had an echocardiogram performed which showed an EF of 25%.  He had a stress test which showed no reversible ischemia, but possible prior MI.  Cardiology was consulted, recommended GDMT and recommended follow-up for consideration of outpatient cardiac MRI.    Patient feels well, denies chest pain, shortness of breath at Rest, with ADLS or exertion, palpitations, dizziness/lightheadedness, orthopnea, PND or Edema.     Pt reports his Home weights have been between 194-198 lbs.  He reports his weights have been fluctuating a lot because of his bowel movements.  He states with his recent diet changes, he has been irregular with his bowel movements.    He mentions his home blood pressures have been around 110, systolic.    He reports using cocaine in his 20s, with binge drinking, but currently, he does smoke marijuana, does not use any other recreational drugs and has an occasional alcoholic beverage.  He denies any tobacco use.    Patient reports his mother  in her 40s from ALS, and he does not know any medical history from his father as he left the family when he was young.  He does not know of any family medical history of heart disease or heart failure.    Patient reports he is back to work.    Patient did have some hypertension when he presented to the hospital, he mentions when he has gone to the doctor in the past, he was told that he had elevated blood  pressure, but he cannot remember how high it was.  He states he never was placed on any medications.     Patient denies any other known medical problems or history.    Past Medical History:   Diagnosis Date    Arm fracture, left     CHF (congestive heart failure) (HCC)     Hypertension     New HFrEF (heart failure with reduced ejection fraction) (HCC)      Past Surgical History:   Procedure Laterality Date    HEMORRHOIDECTOMY      age 4     Family History   Problem Relation Age of Onset    Other Mother         carpal    ALS Mother     Diabetes Maternal Uncle     Liver Cancer Maternal Uncle     Hypertension Maternal Grandmother     Hypertension Other         cousin     Social History     Socioeconomic History    Marital status: Single     Spouse name: Not on file    Number of children: Not on file    Years of education: Not on file    Highest education level: Not on file   Occupational History    Not on file   Tobacco Use    Smoking status: Never    Smokeless tobacco: Never   Vaping Use    Vaping Use: Some days    Substances: THC    Devices: Disposable   Substance and Sexual Activity    Alcohol use: Yes     Comment: 1-2 times a month maybe a 1-2 drinks    Drug use: Yes     Types: Marijuana, Cocaine     Comment: past cocaine use a few times college age    Sexual activity: Not Currently     Partners: Female   Other Topics Concern    Not on file   Social History Narrative    Not on file     Social Determinants of Health     Financial Resource Strain: Not on file   Food Insecurity: Not on file   Transportation Needs: Not on file   Physical Activity: Not on file   Stress: Not on file   Social Connections: Not on file   Intimate Partner Violence: Not on file   Housing Stability: Not on file     No Known Allergies  Outpatient Encounter Medications as of 2/29/2024   Medication Sig Dispense Refill    carvedilol (COREG) 25 MG Tab Take 1 Tablet by mouth 2 times a day with meals. 60 Tablet 3    losartan (COZAAR) 25 MG Tab Take 1  "Tablet by mouth every day. 30 Tablet 3    spironolactone (ALDACTONE) 25 MG Tab Take 1 Tablet by mouth every day. 30 Tablet 3    dapagliflozin propanediol (FARXIGA) 10 MG Tab Take 1 Tablet by mouth every day. 30 Tablet 3     No facility-administered encounter medications on file as of 2/29/2024.     Review of Systems   Constitutional:  Positive for malaise/fatigue. Negative for fever.   Eyes:  Positive for blurred vision.   Respiratory:  Positive for shortness of breath. Negative for cough.    Cardiovascular:  Negative for chest pain, palpitations, orthopnea, claudication, leg swelling and PND.   Gastrointestinal:  Positive for constipation. Negative for abdominal pain.   Musculoskeletal:  Negative for myalgias.   Neurological:  Positive for dizziness.   All other systems reviewed and are negative.             Objective     /68 (BP Location: Left arm, Patient Position: Sitting, BP Cuff Size: Adult)   Pulse 64   Resp 14   Ht 1.778 m (5' 10\")   Wt 93.4 kg (206 lb)   SpO2 97%   BMI 29.56 kg/m²     Physical Exam  Vitals reviewed.   Constitutional:       Appearance: He is well-developed.   HENT:      Head: Normocephalic and atraumatic.   Eyes:      Pupils: Pupils are equal, round, and reactive to light.   Neck:      Vascular: No JVD.   Cardiovascular:      Rate and Rhythm: Normal rate and regular rhythm.      Heart sounds: Normal heart sounds.   Pulmonary:      Effort: Pulmonary effort is normal. No respiratory distress.      Breath sounds: Normal breath sounds. No wheezing or rales.   Abdominal:      General: Bowel sounds are normal.      Palpations: Abdomen is soft.   Musculoskeletal:         General: Normal range of motion.      Cervical back: Normal range of motion and neck supple.      Right lower leg: No edema.      Left lower leg: No edema.   Skin:     General: Skin is warm and dry.   Neurological:      General: No focal deficit present.      Mental Status: He is alert and oriented to person, place, and " time.   Psychiatric:         Behavior: Behavior normal.              Lab Results   Component Value Date/Time    CHOLSTRLTOT 197 02/16/2024 08:50 AM     (H) 02/16/2024 08:50 AM    HDL 51 02/16/2024 08:50 AM    TRIGLYCERIDE 81 02/16/2024 08:50 AM       Lab Results   Component Value Date/Time    SODIUM 136 02/17/2024 05:16 AM    POTASSIUM 3.9 02/17/2024 05:16 AM    CHLORIDE 102 02/17/2024 05:16 AM    CO2 18 (L) 02/17/2024 05:16 AM    GLUCOSE 93 02/17/2024 05:16 AM    BUN 18 02/17/2024 05:16 AM    CREATININE 1.45 (H) 02/17/2024 05:16 AM     Lab Results   Component Value Date/Time    ALKPHOSPHAT 78 02/15/2024 12:46 PM    ASTSGOT 38 02/15/2024 12:46 PM    ALTSGPT 53 (H) 02/15/2024 12:46 PM    TBILIRUBIN 1.4 02/15/2024 12:46 PM       Transthoracic Echo Report 2/16/2024  Severely reduced left ventricular systolic function.  The left ventricular ejection fraction is visually estimated to be 25%.  The left ventricle is moderately dilated, LVEDD 6.6cm.  Moderately dilated right ventricle.  Reduced right ventricular systolic function with preserved TAPSE of   2.1cm.  Severely dilated >48 mL/sq m.  Mild aortic insufficiency.  Normal estimated right atrial pressure.   Mild pulmonary hypertension, estimated PASP of 35mmHg.   Cannot rule out apical thrombus, consider repeat limited study with contrast if clinically indicated.     No prior study is available for comparison.      Discussed with Dr. Lynne.       Myocardial Perfusion Report 2/16/2024   NUCLEAR IMAGING INTERPRETATION   No convincing reversible ischemia.      There is small to moderate fixed perfusion defect at the mid anterior wall.    This could be myocardial scar.      There is global hypokinesis of left ventricle with a very low LVEF of 24%    which is suggestive of dilated cardiomyopathy. Recommend cardiology    consultation and correlation with echocardiogram.   ECG INTERPRETATION   Negative stress ECG for ischemia.    Date 6MWT MLWHF   2/29/2024 314 m in 6  minutes 52                       Assessment & Plan     1. Shortness of breath  Basic Metabolic Panel (BMP)    proBrain Natriuretic Peptide, NT      2. ACC/AHA stage C heart failure with reduced ejection fraction (HCC)  Basic Metabolic Panel (BMP)    proBrain Natriuretic Peptide, NT    REFERRAL TO CARDIOLOGY - HEART FAILURE NURSING EDUCATION    MR-CARDIAC MORPH/FUNC WITH & W/O      3. Chronic systolic congestive heart failure, NYHA class 2 (HCC)  Basic Metabolic Panel (BMP)    proBrain Natriuretic Peptide, NT    REFERRAL TO CARDIOLOGY - HEART FAILURE NURSING EDUCATION    MR-CARDIAC MORPH/FUNC WITH & W/O      4. Primary hypertension            Medical Decision Making: Today's Assessment/Status/Plan:        HFrEF, Stage C, Class 1-2, LVEF 25%: Based on physical examination findings, patient is euvolemic. No JVD, lungs are clear to auscultation, no pitting edema in bilateral lower extremities, no ascites.   -Heart failure due to nonischemic cardiomyopathy  -Discussed Heart failure trajectory and prognosis with patient. Will continue to optimize medical therapy as tolerated. Advanced HF treatment, need for remote monitoring consideration at every visit.   -ACE-I/ARB/ARNI: Continue losartan 25 mg daily, consider Entresto in the future  -Evidence Based Beta-blocker: Continue carvedilol 25 mg twice a day  -Aldosterone Antagonist: Continue spironolactone 25 mg daily  -Diuretic: Consider if needed  -SGLT2 inhibitor: Continue Farxiga 10 mg daily  -Other: Consider as needed (Hydralazine/Isosorbide, Vericiguat, Corlanor)  -Labs: BMP, NT proBNP-Labs Ordered, to be done in 3 weeks, Will continue to closely monitor for side effects of patient's high risk medication(s) including renal function, liver function NTproBNP/cardiac markers, and electrolytes as needed  -discussed importance of exercise and regular activity  -Patient to be sent for cardiac MRI  -Discussed/reviewed maintaining a low Sodium and hydration  recommendations  -Repeat Echo in 3-4 months, if LVEF not >35%, then will discuss/consider ICD for primary Prevention.    -Reinforced s/sx of worsening heart failure with patient and weight monitoring. Pt verbalizes understanding. Pt to call office or RTC if present.    -PUMP line number [phone] (PUMP) reviewed with patient  -Heart Failure Education: Discussed the Definition of heart failure (linking disease, symptoms, and treatment) and causes of heart failure  Recognition of escalating symptoms and concrete plan for response to particular symptoms  Patient referred for formal heart failure education  Risk modification for heart failure progression  Specific diet recommendations: 2000 mg sodium diet  Activity as tolerated  Importance of treatment adherence  Behavioral strategies to promote treatment adherence  -Advanced care planning: Advance directive and POLST to be discussed at future visit  -Pharmacotherapy Referral: Consider referral at follow-up visit  -Compliance Barriers: None  -Genetic testing Consideration: Did briefly discuss this with patient, he will think about it  -Consideration for LVAD and/or Heart transplant as necessary      Hypertension:  -BP stable  -Continue recommendations per above    FU in clinic in 4 weeks with labs and cardiac MRI if able. Sooner if needed.    Patient verbalizes understanding and agrees with the plan of care.     PLEASE NOTE: This Note was created using voice recognition Software. I have made every reasonable attempt to correct obvious errors, but I expect that there are errors of grammar and possibly content that I did not discover before finalizing the note

## 2024-03-04 ENCOUNTER — OFFICE VISIT (OUTPATIENT)
Dept: SLEEP MEDICINE | Facility: MEDICAL CENTER | Age: 39
End: 2024-03-04
Attending: INTERNAL MEDICINE
Payer: COMMERCIAL

## 2024-03-04 VITALS
OXYGEN SATURATION: 95 % | WEIGHT: 201 LBS | BODY MASS INDEX: 28.14 KG/M2 | HEIGHT: 71 IN | SYSTOLIC BLOOD PRESSURE: 132 MMHG | DIASTOLIC BLOOD PRESSURE: 70 MMHG | HEART RATE: 64 BPM

## 2024-03-04 DIAGNOSIS — I50.20 SYSTOLIC HEART FAILURE, UNSPECIFIED HF CHRONICITY (HCC): ICD-10-CM

## 2024-03-04 DIAGNOSIS — R59.1 LYMPHADENOPATHY: ICD-10-CM

## 2024-03-04 DIAGNOSIS — F12.90 MARIJUANA SMOKER: ICD-10-CM

## 2024-03-04 PROCEDURE — 99212 OFFICE O/P EST SF 10 MIN: CPT | Performed by: INTERNAL MEDICINE

## 2024-03-04 PROCEDURE — 99204 OFFICE O/P NEW MOD 45 MIN: CPT | Performed by: INTERNAL MEDICINE

## 2024-03-04 PROCEDURE — 3078F DIAST BP <80 MM HG: CPT | Performed by: INTERNAL MEDICINE

## 2024-03-04 PROCEDURE — 3075F SYST BP GE 130 - 139MM HG: CPT | Performed by: INTERNAL MEDICINE

## 2024-03-04 ASSESSMENT — ENCOUNTER SYMPTOMS
SPUTUM PRODUCTION: 1
DIARRHEA: 0
CHILLS: 0
SORE THROAT: 0
ABDOMINAL PAIN: 0
WHEEZING: 0
PHOTOPHOBIA: 0
MYALGIAS: 0
SPEECH CHANGE: 0
TREMORS: 0
NAUSEA: 0
CONSTIPATION: 0
PALPITATIONS: 0
SHORTNESS OF BREATH: 0
COUGH: 0
BACK PAIN: 0
FEVER: 0
FOCAL WEAKNESS: 0
BLURRED VISION: 0
STRIDOR: 0
FALLS: 0
EYE PAIN: 0
EYE DISCHARGE: 0
DIAPHORESIS: 0
EYE REDNESS: 0
HEARTBURN: 0
PND: 0
WEIGHT LOSS: 0
DEPRESSION: 0
DOUBLE VISION: 0
HEADACHES: 0
ORTHOPNEA: 0
NECK PAIN: 0
DIZZINESS: 0
VOMITING: 0
WEAKNESS: 0
SINUS PAIN: 0
HEMOPTYSIS: 0
CLAUDICATION: 0

## 2024-03-04 ASSESSMENT — FIBROSIS 4 INDEX: FIB4 SCORE: 0.66

## 2024-03-04 NOTE — Clinical Note
Gaetano Desai was concerned about when he should be measuring his blood pressure.  He tells me you changed his losartan to the evening due to some vision effects he was having when taking all medications at once in the morning.  He tells me he is getting systolic blood pressures in the 140s to 150s in the morning which he measures about the same time he takes his medications so they have not taken effect yet.  I recommended he reach out to you all to determine when the best time to take his blood pressure was and also regarding when to take medications.  Sounds that he was not sure on that. Nicolle

## 2024-03-04 NOTE — PROGRESS NOTES
Chief Complaint   Patient presents with    New Patient     REF BY DR. BOBO FOR  Incidental lung nodule    Results     CXR 2/17/24, 2/15/24       HPI: This patient is a 38 y.o. male presenting for evaluation of abnormal CT chest.  The patient has no significant past medical history prior to February when he woke up on a Tuesday feeling somewhat weak and diaphoretic.  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  He opted not to go to work but did not seek medical attention and went home after which the chest pain resolved spontaneously after 4 hours.  Afterward he felt significantly fatigued and at the urging of friends and coworkers was seen in the emergency department 2 days later on 2/15 at which point ECG was abnormal and he was referred to the emergency room.  CT chest with contrast there showed no parenchymal lung disease, 4 mm right lower lobe lung nodule and some mediastinal and right hilar lymphadenopathy.  Echocardiogram showed an ejection fraction of 25% and patient underwent nuclear stress test was suggested possible scar but no reversible ischemia.  He was discharged from the hospital and followed up for heart failure and hypertension.  Today he continues to have some mild fatigue and mild shortness of breath with activity but no ongoing chest pain.  No chronic cough.  No wheezing.  He tells me he has been attempting to lose weight over the past year and has successfully lost roughly 20 pounds but is not taking any stimulants or appetite suppressants.  He he smoked less than half pack per day for period of roughly 6 years while in college but has been tobacco free for over 10 years.  He does smoke marijuana 4-5 times per week in the evenings and typically rolls his own or uses a pipe.  Only occasional vape pen use.    Past Medical History:   Diagnosis Date    Arm fracture, left     CHF (congestive heart failure) ([facility])      Hypertension     New HFrEF (heart failure with reduced ejection fraction) ([facility])        Social History     Socioeconomic History    Marital status: Single     Spouse name: Not on file    Number of children: Not on file    Years of education: Not on file    Highest education level: Not on file   Occupational History    Not on file   Tobacco Use    Smoking status: Never    Smokeless tobacco: Never   Vaping Use    Vaping Use: Some days    Substances: THC    Devices: Disposable   Substance and Sexual Activity    Alcohol use: Yes     Comment: 1-2 times a month maybe a 1-2 drinks    Drug use: Yes     Types: Marijuana, Cocaine     Comment: past cocaine use a few times college age    Sexual activity: Not Currently     Partners: Female   Other Topics Concern    Not on file   Social History Narrative    Not on file     Social Determinants of Health     Financial Resource Strain: Not on file   Food Insecurity: Not on file   Transportation Needs: Not on file   Physical Activity: Not on file   Stress: Not on file   Social Connections: Not on file   Intimate Partner Violence: Not on file   Housing Stability: Not on file       Family History   Problem Relation Age of Onset    Other Mother         carpal    ALS Mother     Diabetes Maternal Uncle     Liver Cancer Maternal Uncle     Hypertension Maternal Grandmother     Hypertension Other         cousin       Current Outpatient Medications on File Prior to Visit   Medication Sig Dispense Refill    carvedilol (COREG) 25 MG Tab Take 1 Tablet by mouth 2 times a day with meals. 60 Tablet 3    losartan (COZAAR) 25 MG Tab Take 1 Tablet by mouth every day. 30 Tablet 3    spironolactone (ALDACTONE) 25 MG Tab Take 1 Tablet by mouth every day. 30 Tablet 3    dapagliflozin propanediol (FARXIGA) 10 MG Tab Take 1 Tablet by mouth every day. 30 Tablet 3     No current facility-administered medications on file prior to visit.       Allergies: Patient has no known allergies.    ROS:   Review of  "Systems   Constitutional:  Negative for chills, diaphoresis, fever, malaise/fatigue and weight loss.   HENT:  Negative for congestion, ear discharge, ear pain, hearing loss, nosebleeds, sinus pain, sore throat and tinnitus.    Eyes:  Negative for blurred vision, double vision, photophobia, pain, discharge and redness.   Respiratory:  Positive for sputum production. Negative for cough, hemoptysis, shortness of breath, wheezing and stridor.    Cardiovascular:  Negative for chest pain, palpitations, orthopnea, claudication, leg swelling and PND.   Gastrointestinal:  Negative for abdominal pain, constipation, diarrhea, heartburn, nausea and vomiting.   Genitourinary:  Negative for dysuria and urgency.   Musculoskeletal:  Negative for back pain, falls, joint pain, myalgias and neck pain.   Skin:  Negative for itching and rash.   Neurological:  Negative for dizziness, tremors, speech change, focal weakness, weakness and headaches.   Endo/Heme/Allergies:  Negative for environmental allergies.   Psychiatric/Behavioral:  Negative for depression.        /70 (BP Location: Right arm, Patient Position: Sitting, BP Cuff Size: Adult)   Pulse 64   Ht 1.803 m (5' 11\")   Wt 91.2 kg (201 lb)   SpO2 95%     Physical Exam:  Physical Exam  Vitals reviewed.   Constitutional:       General: He is not in acute distress.     Appearance: Normal appearance. He is normal weight.   HENT:      Head: Normocephalic and atraumatic.      Right Ear: External ear normal.      Left Ear: External ear normal.      Nose: Nose normal. No congestion.      Mouth/Throat:      Mouth: Mucous membranes are moist.      Pharynx: Oropharynx is clear. No oropharyngeal exudate.   Eyes:      General: No scleral icterus.     Extraocular Movements: Extraocular movements intact.      Conjunctiva/sclera: Conjunctivae normal.      Pupils: Pupils are equal, round, and reactive to light.   Cardiovascular:      Rate and Rhythm: Normal rate and regular rhythm.      " Heart sounds: Normal heart sounds. No murmur heard.     No gallop.   Pulmonary:      Effort: Pulmonary effort is normal. No respiratory distress.      Breath sounds: Normal breath sounds. No wheezing or rales.   Abdominal:      General: There is no distension.      Palpations: Abdomen is soft.   Musculoskeletal:         General: Normal range of motion.      Cervical back: Normal range of motion and neck supple.      Right lower leg: No edema.      Left lower leg: No edema.   Skin:     General: Skin is warm and dry.      Findings: No rash.   Neurological:      Mental Status: He is alert and oriented to person, place, and time.      Cranial Nerves: No cranial nerve deficit.   Psychiatric:         Mood and Affect: Mood normal.         Behavior: Behavior normal.         PFTs as reviewed by me personally: None    Imaging as reviewed by me personally: As per HPI    Assessment:  1. Lymphadenopathy  CT-CHEST (THORAX) WITH      2. Systolic heart failure, unspecified HF chronicity (HCC)        3. Marijuana smoker            Plan:  SPECT this is secondary to acute heart failure however somewhat atypical given mostly unilateral.  Will repeat CT in 8 to 10 weeks to evaluate for resolution.  Pending results can consider sampling or PET scan.  Acute and history would suggest a completed infarct however given no reversible ischemia on nuclear stress test no catheterization was pursued.  Follow-up with cardiology.  Counseled on complete cessation of inhaled products.  Return in about 4 months (around 7/4/2024) for CT chest .

## 2024-03-06 ENCOUNTER — TELEPHONE (OUTPATIENT)
Dept: CARDIOLOGY | Facility: MEDICAL CENTER | Age: 39
End: 2024-03-06
Payer: COMMERCIAL

## 2024-03-06 NOTE — TELEPHONE ENCOUNTER
Message  Received: Yesterday  KAVITHA Garcia R.N.  Can you follow up with this patient tomorrow. Thanks!    Mer[place]          Previous Messages       ----- Message -----  From: Ashley García M.D.  Sent: 3/4/2024   1:29 PM PST  To: KAVITHA Garcia Kevin was concerned about when he should be measuring his blood pressure.  He tells me you changed his losartan to the evening due to some vision effects he was having when taking all medications at once in the morning.  He tells me he is getting systolic blood pressures in the 140s to 150s in the morning which he measures about the same time he takes his medications so they have not taken effect yet.  I recommended he reach out to you all to determine when the best time to take his blood pressure was and also regarding when to take medications.  Sounds that he was not sure on that.  Nicolle  ------------------------------------------------------------------  See [employer] message

## 2024-03-16 ENCOUNTER — HOSPITAL ENCOUNTER (OUTPATIENT)
Dept: LAB | Facility: MEDICAL CENTER | Age: 39
End: 2024-03-16
Attending: NURSE PRACTITIONER
Payer: COMMERCIAL

## 2024-03-16 ENCOUNTER — HOSPITAL ENCOUNTER (OUTPATIENT)
Dept: LAB | Facility: MEDICAL CENTER | Age: 39
End: 2024-03-16
Attending: FAMILY MEDICINE
Payer: COMMERCIAL

## 2024-03-16 DIAGNOSIS — R80.1 PERSISTENT PROTEINURIA: ICD-10-CM

## 2024-03-16 DIAGNOSIS — I50.22 CHRONIC SYSTOLIC CONGESTIVE HEART FAILURE, NYHA CLASS 2 (HCC): ICD-10-CM

## 2024-03-16 DIAGNOSIS — R06.02 SHORTNESS OF BREATH: ICD-10-CM

## 2024-03-16 DIAGNOSIS — I50.20 ACC/AHA STAGE C HEART FAILURE WITH REDUCED EJECTION FRACTION (HCC): ICD-10-CM

## 2024-03-16 LAB
ANION GAP SERPL CALC-SCNC: 12 MMOL/L (ref 7–16)
BUN SERPL-MCNC: 27 MG/DL (ref 8–22)
CALCIUM SERPL-MCNC: 9.5 MG/DL (ref 8.5–10.5)
CHLORIDE SERPL-SCNC: 103 MMOL/L (ref 96–112)
CO2 SERPL-SCNC: 22 MMOL/L (ref 20–33)
CREAT SERPL-MCNC: 1.34 MG/DL (ref 0.5–1.4)
CREAT UR-MCNC: 50.56 MG/DL
GFR SERPLBLD CREATININE-BSD FMLA CKD-EPI: 70 ML/MIN/1.73 M 2
GLUCOSE SERPL-MCNC: 123 MG/DL (ref 65–99)
NT-PROBNP SERPL IA-MCNC: 258 PG/ML (ref 0–125)
POTASSIUM SERPL-SCNC: 4.6 MMOL/L (ref 3.6–5.5)
PROT UR-MCNC: 13 MG/DL (ref 0–15)
PROT/CREAT UR: 257 MG/G (ref 15–68)
SODIUM SERPL-SCNC: 137 MMOL/L (ref 135–145)

## 2024-03-16 PROCEDURE — 84165 PROTEIN E-PHORESIS SERUM: CPT

## 2024-03-16 PROCEDURE — 83880 ASSAY OF NATRIURETIC PEPTIDE: CPT

## 2024-03-16 PROCEDURE — 84156 ASSAY OF PROTEIN URINE: CPT

## 2024-03-16 PROCEDURE — 36415 COLL VENOUS BLD VENIPUNCTURE: CPT

## 2024-03-16 PROCEDURE — 80048 BASIC METABOLIC PNL TOTAL CA: CPT

## 2024-03-16 PROCEDURE — 82570 ASSAY OF URINE CREATININE: CPT

## 2024-03-16 PROCEDURE — 84155 ASSAY OF PROTEIN SERUM: CPT

## 2024-03-18 ENCOUNTER — PHARMACY VISIT (OUTPATIENT)
Dept: PHARMACY | Facility: MEDICAL CENTER | Age: 39
End: 2024-03-18
Payer: COMMERCIAL

## 2024-03-18 PROCEDURE — RXMED WILLOW AMBULATORY MEDICATION CHARGE: Performed by: STUDENT IN AN ORGANIZED HEALTH CARE EDUCATION/TRAINING PROGRAM

## 2024-03-19 LAB
ALBUMIN SERPL ELPH-MCNC: 4.35 G/DL (ref 3.75–5.01)
ALPHA1 GLOB SERPL ELPH-MCNC: 0.24 G/DL (ref 0.19–0.46)
ALPHA2 GLOB SERPL ELPH-MCNC: 0.7 G/DL (ref 0.48–1.05)
B-GLOBULIN SERPL ELPH-MCNC: 0.96 G/DL (ref 0.48–1.1)
GAMMA GLOB SERPL ELPH-MCNC: 1.25 G/DL (ref 0.62–1.51)
INTERPRETATION SERPL IFE-IMP: NORMAL
MONOCLON BAND OBS SERPL: NORMAL
MONOCLONAL PROTEIN NL11656: NORMAL G/DL
PATHOLOGY STUDY: NORMAL
PROT SERPL-MCNC: 7.5 G/DL (ref 6.3–8.2)

## 2024-03-26 ENCOUNTER — APPOINTMENT (OUTPATIENT)
Dept: CARDIOLOGY | Facility: MEDICAL CENTER | Age: 39
End: 2024-03-26
Attending: NURSE PRACTITIONER
Payer: COMMERCIAL

## 2024-03-26 VITALS
BODY MASS INDEX: 30.24 KG/M2 | DIASTOLIC BLOOD PRESSURE: 60 MMHG | OXYGEN SATURATION: 96 % | WEIGHT: 216 LBS | HEART RATE: 74 BPM | HEIGHT: 71 IN | SYSTOLIC BLOOD PRESSURE: 88 MMHG | RESPIRATION RATE: 14 BRPM

## 2024-03-26 DIAGNOSIS — R73.03 PREDIABETES: ICD-10-CM

## 2024-03-26 DIAGNOSIS — I10 PRIMARY HYPERTENSION: ICD-10-CM

## 2024-03-26 DIAGNOSIS — I50.20 ACC/AHA STAGE C HEART FAILURE WITH REDUCED EJECTION FRACTION (HCC): ICD-10-CM

## 2024-03-26 PROCEDURE — 3078F DIAST BP <80 MM HG: CPT | Performed by: NURSE PRACTITIONER

## 2024-03-26 PROCEDURE — 99213 OFFICE O/P EST LOW 20 MIN: CPT | Performed by: NURSE PRACTITIONER

## 2024-03-26 PROCEDURE — 99211 OFF/OP EST MAY X REQ PHY/QHP: CPT | Performed by: NURSE PRACTITIONER

## 2024-03-26 PROCEDURE — 3074F SYST BP LT 130 MM HG: CPT | Performed by: NURSE PRACTITIONER

## 2024-03-26 ASSESSMENT — FIBROSIS 4 INDEX: FIB4 SCORE: 0.66

## 2024-03-26 NOTE — PROGRESS NOTES
"Chief Complaint   Patient presents with    Shortness of Breath    Hypertension    Congestive Heart Failure     F/v dx: HFrEF (heart failure with reduced ejection fraction) ([facility])         Subjective     Gaetano Boles is a 38 y.o. male who presents today for follow up on his heart failure.    Patient had a recent hospitalization from 2/15/2024 through 2024.  He presented with chest pain.  He also had generalized weakness, dizziness and nausea/vomiting.  Patient had an echocardiogram performed which showed an EF of 25%.  He had a stress test which showed no reversible ischemia, but possible prior MI.  Cardiology was consulted, recommended GDMT and recommended follow-up for consideration of outpatient cardiac MRI.    Patient and family and social history includes per Merced Calderon, \"He reports using cocaine in his 20s, with binge drinking, but currently, he does smoke marijuana, does not use any other recreational drugs and has an occasional alcoholic beverage.  He denies any tobacco use.    Patient reports his mother  in her 40s from ALS, and he does not know any medical history from his father as he left the family when he was young.  He does not know of any family medical history of heart disease or heart failure.\"    Patient was last seen by CELENA Garcia on 2024.    Today, Patient feels well, denies chest pain, shortness of breath, palpitations, dizziness/lightheadedness, orthopnea, PND or Edema.  Blood pressure low in office today, patient denies exacerbation of symptoms.  Patient reports blood pressure cuff at his home systolically 120 to 130s.  We will have patient bring in his blood pressure cuff to next appointment to compare.  Patient reports he is tired in the morning as well as experiencing mild constipation.  Due to fatigue and low blood pressure we will hold on uptitrating GDMT and continue as currently tolerated doses.     Renal and electrolyte function reviewed per below.  " Patient appears slightly dehydrated we will have patient increase his fluid intake to a goal of 2 to 3 L daily.  Patient notes limited physical activity due to work schedule not exacerbation of symptoms. Encouraged patient to increase activity tolerance as tolerated with signs and symptoms of overexertion.    We will have patient follow-up in 6 weeks.  Patient has cardiac MRI scheduled for next month.  We will obtain follow-up echocardiogram in May.  Patient to follow-up with Dr. Tineo after echocardiogram.    Past Medical History:   Diagnosis Date    Arm fracture, left     CHF (congestive heart failure) (HCC)     Hypertension     New HFrEF (heart failure with reduced ejection fraction) (HCC)      Past Surgical History:   Procedure Laterality Date    HEMORRHOIDECTOMY      age 4     Family History   Problem Relation Age of Onset    Other Mother         carpal    ALS Mother     Diabetes Maternal Uncle     Liver Cancer Maternal Uncle     Hypertension Maternal Grandmother     Hypertension Other         cousin     Social History     Socioeconomic History    Marital status: Single     Spouse name: Not on file    Number of children: Not on file    Years of education: Not on file    Highest education level: Not on file   Occupational History    Not on file   Tobacco Use    Smoking status: Never    Smokeless tobacco: Never   Vaping Use    Vaping Use: Some days    Substances: THC    Devices: Disposable   Substance and Sexual Activity    Alcohol use: Yes     Comment: 1-2 times a month maybe a 1-2 drinks    Drug use: Yes     Types: Marijuana, Cocaine     Comment: past cocaine use a few times college age    Sexual activity: Not Currently     Partners: Female   Other Topics Concern    Not on file   Social History Narrative    Not on file     Social Determinants of Health     Financial Resource Strain: Not on file   Food Insecurity: Not on file   Transportation Needs: Not on file   Physical Activity: Not on file   Stress: Not on  "file   Social Connections: Not on file   Intimate Partner Violence: Not on file   Housing Stability: Not on file     No Known Allergies  Outpatient Encounter Medications as of 3/26/2024   Medication Sig Dispense Refill    carvedilol (COREG) 25 MG Tab Take 1 Tablet by mouth 2 times a day with meals. 60 Tablet 3    losartan (COZAAR) 25 MG Tab Take 1 Tablet by mouth every day. 30 Tablet 3    spironolactone (ALDACTONE) 25 MG Tab Take 1 Tablet by mouth every day. 30 Tablet 3    dapagliflozin propanediol (FARXIGA) 10 MG Tab Take 1 Tablet by mouth every day. 30 Tablet 3     No facility-administered encounter medications on file as of 3/26/2024.     ROS Complete review of systems negative except as noted in HPI/subjective           Objective     BP (!) 88/60 (BP Location: Left arm, Patient Position: Sitting, BP Cuff Size: Adult)   Pulse 74   Resp 14   Ht 1.803 m (5' 11\")   Wt 98 kg (216 lb)   SpO2 96%   BMI 30.13 kg/m²     Physical Exam  Vitals reviewed.   Constitutional:       Appearance: He is well-developed.   HENT:      Head: Normocephalic and atraumatic.   Eyes:      Pupils: Pupils are equal, round, and reactive to light.   Neck:      Vascular: No JVD.   Cardiovascular:      Rate and Rhythm: Normal rate and regular rhythm.      Heart sounds: Normal heart sounds.   Pulmonary:      Effort: Pulmonary effort is normal. No respiratory distress.      Breath sounds: Normal breath sounds. No wheezing or rales.   Abdominal:      General: Bowel sounds are normal.      Palpations: Abdomen is soft.   Musculoskeletal:         General: Normal range of motion.      Cervical back: Normal range of motion and neck supple.      Right lower leg: No edema.      Left lower leg: No edema.   Skin:     General: Skin is warm and dry.   Neurological:      General: No focal deficit present.      Mental Status: He is alert and oriented to person, place, and time.   Psychiatric:         Behavior: Behavior normal.              Lab Results "   Component Value Date/Time    CHOLSTRLTOT 197 02/16/2024 08:50 AM     (H) 02/16/2024 08:50 AM    HDL 51 02/16/2024 08:50 AM    TRIGLYCERIDE 81 02/16/2024 08:50 AM       Lab Results   Component Value Date/Time    SODIUM 137 03/16/2024 07:51 AM    POTASSIUM 4.6 03/16/2024 07:51 AM    CHLORIDE 103 03/16/2024 07:51 AM    CO2 22 03/16/2024 07:51 AM    GLUCOSE 123 (H) 03/16/2024 07:51 AM    BUN 27 (H) 03/16/2024 07:51 AM    CREATININE 1.34 03/16/2024 07:51 AM     Lab Results   Component Value Date/Time    ALKPHOSPHAT 78 02/15/2024 12:46 PM    ASTSGOT 38 02/15/2024 12:46 PM    ALTSGPT 53 (H) 02/15/2024 12:46 PM    TBILIRUBIN 1.4 02/15/2024 12:46 PM       Transthoracic Echo Report 2/16/2024  Severely reduced left ventricular systolic function.  The left ventricular ejection fraction is visually estimated to be 25%.  The left ventricle is moderately dilated, LVEDD 6.6cm.  Moderately dilated right ventricle.  Reduced right ventricular systolic function with preserved TAPSE of   2.1cm.  Severely dilated >48 mL/sq m.  Mild aortic insufficiency.  Normal estimated right atrial pressure.   Mild pulmonary hypertension, estimated PASP of 35mmHg.   Cannot rule out apical thrombus, consider repeat limited study with contrast if clinically indicated.     No prior study is available for comparison.      Discussed with Dr. Lynne.       Myocardial Perfusion Report 2/16/2024   NUCLEAR IMAGING INTERPRETATION   No convincing reversible ischemia.      There is small to moderate fixed perfusion defect at the mid anterior wall.    This could be myocardial scar.      There is global hypokinesis of left ventricle with a very low LVEF of 24%    which is suggestive of dilated cardiomyopathy. Recommend cardiology    consultation and correlation with echocardiogram.   ECG INTERPRETATION   Negative stress ECG for ischemia.    Date 6MWT MLWHF   2/29/2024 314 m in 6 minutes 52                       Assessment & Plan     1. ACC/AHA stage C heart  failure with reduced ejection fraction (HCC)  EC-ECHOCARDIOGRAM COMPLETE W/O CONT      2. Primary hypertension  EC-ECHOCARDIOGRAM COMPLETE W/O CONT      3. Prediabetes              Medical Decision Making: Today's Assessment/Status/Plan:        HFrEF, Stage C, Class 1-2, LVEF 25%: Based on physical examination findings, patient is euvolemic. No JVD, lungs are clear to auscultation, no pitting edema in bilateral lower extremities, no ascites.   -Heart failure due to nonischemic cardiomyopathy  -Discussed Heart failure trajectory and prognosis with patient. Will continue to optimize medical therapy as tolerated. Advanced HF treatment, need for remote monitoring consideration at every visit.   -ACE-I/ARB/ARNI: Continue losartan 25 mg daily, consider Entresto in the future once fatigue and blood pressure improves  -Evidence Based Beta-blocker: Continue carvedilol 25 mg twice a day  -Aldosterone Antagonist: Continue spironolactone 25 mg daily  -Diuretic: Consider if needed.  Currently euvolemic on exam  -SGLT2 inhibitor: Continue Farxiga 10 mg daily  -Other: Consider as needed (Hydralazine/Isosorbide, Vericiguat, Corlanor)  -Labs: Will continue to closely monitor for side effects of patient's high risk medication(s) including renal function, liver function NTproBNP/cardiac markers, and electrolytes as needed  -discussed importance of exercise and regular activity  -Patient to be sent for cardiac MRI, scheduled for April  -Discussed/reviewed maintaining a low Sodium and hydration recommendations  -Repeat Echo in 2-3 months (scheduled for May), if LVEF not >35%, then will discuss/consider ICD for primary Prevention.    -Reinforced s/sx of worsening heart failure with patient and weight monitoring. Pt verbalizes understanding. Pt to call office or RTC if present.    -PUMP line number [phone] (PUMP) reviewed with patient  -Heart Failure Education: Discussed the Definition of heart failure (linking disease, symptoms, and  treatment) and causes of heart failure  Recognition of escalating symptoms and concrete plan for response to particular symptoms  Patient referred for formal heart failure education  Risk modification for heart failure progression  Specific diet recommendations: 2000 mg sodium diet  Activity as tolerated  Importance of treatment adherence  Behavioral strategies to promote treatment adherence  -Advanced care planning: Advance directive and POLST to be discussed at future visit  -Pharmacotherapy Referral: Consider referral at follow-up visit  -Compliance Barriers: None  -Genetic testing Consideration: Consider after cardiac MRI and echocardiogram.  Patient agreeable  -Consideration for LVAD and/or Heart transplant as necessary      Hypertension:  -BP stable  -Continue recommendations per above    FU in clinic in 6 weeks with heart failure clinic. Sooner if needed.    Patient verbalizes understanding and agrees with the plan of care.       SYD [email or web address], HF-Cert   [facility]  [phone]    PLEASE NOTE: This Note was created using voice recognition Software. I have made every reasonable attempt to correct obvious errors, but I expect that there are errors of grammar and possibly content that I did not discover before finalizing the note

## 2024-04-04 ENCOUNTER — OFFICE VISIT (OUTPATIENT)
Dept: CARDIOLOGY | Facility: MEDICAL CENTER | Age: 39
End: 2024-04-04
Attending: NURSE PRACTITIONER
Payer: COMMERCIAL

## 2024-04-04 ENCOUNTER — OFFICE VISIT (OUTPATIENT)
Dept: MEDICAL GROUP | Facility: MEDICAL CENTER | Age: 39
End: 2024-04-04
Payer: COMMERCIAL

## 2024-04-04 VITALS
SYSTOLIC BLOOD PRESSURE: 102 MMHG | HEIGHT: 71 IN | WEIGHT: 212 LBS | TEMPERATURE: 98.1 F | BODY MASS INDEX: 29.68 KG/M2 | RESPIRATION RATE: 16 BRPM | HEART RATE: 60 BPM | DIASTOLIC BLOOD PRESSURE: 70 MMHG | OXYGEN SATURATION: 94 %

## 2024-04-04 DIAGNOSIS — R91.1 INCIDENTAL LUNG NODULE, > 3MM AND < 8MM: ICD-10-CM

## 2024-04-04 DIAGNOSIS — I50.21 ACUTE HFREF (HEART FAILURE WITH REDUCED EJECTION FRACTION) (HCC): ICD-10-CM

## 2024-04-04 DIAGNOSIS — Z23 NEED FOR VACCINATION: ICD-10-CM

## 2024-04-04 DIAGNOSIS — R80.1 PERSISTENT PROTEINURIA: ICD-10-CM

## 2024-04-04 DIAGNOSIS — I10 PRIMARY HYPERTENSION: ICD-10-CM

## 2024-04-04 DIAGNOSIS — I50.20 HFREF (HEART FAILURE WITH REDUCED EJECTION FRACTION) (HCC): ICD-10-CM

## 2024-04-04 DIAGNOSIS — R73.03 PREDIABETES: ICD-10-CM

## 2024-04-04 DIAGNOSIS — Z71.89 ENCOUNTER FOR EDUCATION ABOUT HEART FAILURE: ICD-10-CM

## 2024-04-04 DIAGNOSIS — K59.00 CONSTIPATION, UNSPECIFIED CONSTIPATION TYPE: ICD-10-CM

## 2024-04-04 PROCEDURE — 99214 OFFICE O/P EST MOD 30 MIN: CPT | Mod: 25 | Performed by: FAMILY MEDICINE

## 2024-04-04 PROCEDURE — 90677 PCV20 VACCINE IM: CPT | Performed by: FAMILY MEDICINE

## 2024-04-04 PROCEDURE — 3078F DIAST BP <80 MM HG: CPT | Performed by: FAMILY MEDICINE

## 2024-04-04 PROCEDURE — 90471 IMMUNIZATION ADMIN: CPT | Performed by: FAMILY MEDICINE

## 2024-04-04 PROCEDURE — 3074F SYST BP LT 130 MM HG: CPT | Performed by: FAMILY MEDICINE

## 2024-04-04 PROCEDURE — 99999 PR NO CHARGE: CPT | Performed by: NURSE PRACTITIONER

## 2024-04-04 RX ORDER — LOSARTAN POTASSIUM 25 MG/1
25 TABLET ORAL DAILY
Qty: 90 TABLET | Refills: 1 | Status: SHIPPED | OUTPATIENT
Start: 2024-04-04

## 2024-04-04 RX ORDER — CARVEDILOL 25 MG/1
25 TABLET ORAL 2 TIMES DAILY WITH MEALS
Qty: 90 TABLET | Refills: 1 | Status: SHIPPED | OUTPATIENT
Start: 2024-04-04

## 2024-04-04 RX ORDER — SPIRONOLACTONE 25 MG/1
25 TABLET ORAL DAILY
Qty: 90 TABLET | Refills: 1 | Status: SHIPPED | OUTPATIENT
Start: 2024-04-04

## 2024-04-04 RX ORDER — DAPAGLIFLOZIN 10 MG/1
10 TABLET, FILM COATED ORAL DAILY
Qty: 90 TABLET | Refills: 1 | Status: SHIPPED | OUTPATIENT
Start: 2024-04-04

## 2024-04-04 ASSESSMENT — FIBROSIS 4 INDEX: FIB4 SCORE: 0.66

## 2024-04-04 NOTE — PATIENT INSTRUCTIONS
Daily maintenance  - goal daily fiber of 30g, can use fiber supplement, try psyllium (metamucil) in the morning and fiber gummy in evening to help with this  - drink your 90 oz of water per day  - walking every day  - can do one scoop of miralax daily to help    Acute constipation, if bound up  - can increase to 2-4 scoops of miralax throughout the day  - Senna-S at bedtime  - Glycerine suppository if needed

## 2024-04-04 NOTE — PROGRESS NOTES
"Verbal consent was acquired by the patient to use Hover 3D ambient listening note generation during this visit    Subjective:     CC: \"constipation, heart failure follow up\"    History of Present Illness  The patient is a 38-year-old male who is presenting for a follow-up consultation regarding his cardiac condition.    The patient reports an improvement in his condition. Initially, he experienced visual disturbances as a side effect of his morning medication regimen, which prompted his cardiologist to increase his losartan dosage to the evenings. This adjustment has resulted in an improvement in his condition. This morning, he consulted his cardiologist and was diagnosed with constipation, for which he was advised to use an over-the-counter stool softener and incorporate prunes into his diet. His bowel movements are regular, characterized by small pellet-like stools and incomplete evacuation. He has made dietary modifications, including portion control and improved dietary habits. His diet includes rice, boiled meats, steamed vegetables, apples, oranges, and bananas. He is cognizant of his sodium intake and is on a water restriction of approximately 90 ounces per day. His physical activity includes walking for 10 to 20 minutes daily, which he reports has improved his fatigue. He is now capable of 2 to 2.5 laps, but plans to increase to 3 laps next week. He typically discontinues walking due to shortness of breath and perspiration. He has a small bottle of stool softener at home. He is under the care of both cardiology and pulmonology for a lung nodule and is scheduled for an MRI on 05/15/2024, a CT scan on 05/25/2024, and an ultrasound of his kidneys on 05/02/2024. He has a history of chickenpox during infancy and has received 2 doses of the COVID-19 vaccine. He is monitoring his weight, heart rate, and blood pressure using a smartwatch. He denies any leg swelling.          Objective:     Exam:  /70   " "Pulse 60   Temp 36.7 °C (98.1 °F) (Temporal)   Resp 16   Ht 1.803 m (5' 11\")   Wt 96.2 kg (212 lb)   SpO2 94%   BMI 29.57 kg/m²  Body mass index is 29.57 kg/m².    Physical Exam  Vitals reviewed.   Constitutional:       General: He is not in acute distress.     Appearance: Normal appearance.   HENT:      Head: Normocephalic and atraumatic.   Cardiovascular:      Rate and Rhythm: Normal rate and regular rhythm.      Heart sounds: Normal heart sounds.   Pulmonary:      Effort: Pulmonary effort is normal. No respiratory distress.      Breath sounds: Normal breath sounds.   Musculoskeletal:      Right lower leg: No edema.      Left lower leg: No edema.   Skin:     General: Skin is warm and dry.   Neurological:      Mental Status: He is alert. Mental status is at baseline.      Gait: Gait normal.   Psychiatric:         Mood and Affect: Mood normal.         Behavior: Behavior normal.             Results  Laboratory Studies  Protein in urine but no abnormalities within the proteins. Kidney function improved.      Assessment & Plan:       1. HFrEF (heart failure with reduced ejection fraction) ([facility])    2. Constipation, unspecified constipation type    3. Incidental lung nodule, > 3mm and < 8mm    4. Primary hypertension  This is a chronic condition, controlled.  Blood pressure is at goal under 140/90 in the office today.  We will continue the current regimen.    5. Persistent proteinuria  No abnormalities noted on SPEP, renal ultrasound scheduled    6. Need for vaccination  - Pneumococcal Conjugate Vaccine 20-Valent (6 wks+)      Assessment & Plan  1. Cardiac follow-up.  The patient's blood pressure is well-regulated. The patient is advised to maintain his current medication regimen.    2. Constipation.  The patient's dietary fiber intake is set to reach 30 g per day. The use of Metamucil powder or fiber gummies is suggested. The patient is advised to limit toilet time, maintain good posture, and increase his water " intake to 90 ounces daily. Regular walking is encouraged. Instead of stool softeners, a scoop of MiraLAX can be added to his daily water intake. Stool softeners can be used as needed. Over-the-counter glycerin suppositories can be used as needed.    3. Lung nodule.  The patient is scheduled for an MRI on 05/15/2024, a CT scan on 05/25/2024, and an ultrasound of his kidneys on 05/02/2024.    4. Immunization status.  The patient is at high risk for COVID-19 due to cardiac weakness. The patient will receive his pneumonia vaccine today. He is encouraged to receive his COVID-19 booster in the summer.    Follow-up  The patient is scheduled for a follow-up visit in 3 months.         Return in about 3 months (around 7/4/2024), or if symptoms worsen or fail to improve, for Lifestyle, Medication F/U.      This note was created using voice recognition software (Dragon). The accuracy of the dictation is limited by the abilities of the software. I have reviewed the note prior to signing, however some errors in grammar and context are still possible. If you have any questions related to this note please do not hesitate to contact our office.

## 2024-04-04 NOTE — PROGRESS NOTES
Pt arrived for HF education.     Discussed in great detail dysfunction of heart muscle based on description of systolic versus diastolic versus right-sided.     Provided possible etiology based on patient chart review.    Medication purpose and function in body discussed with great detail. Described acclamation period with new medications and titrations.     Reinforced proper home weight and blood pressure monitoring to include BP 2 hours after medication. Tips provided for symptomatic low blood pressure (8 oz of water, small salty snack, laying down with feet elevated). Provided warning signs of orthostatic hypotension.     Educated on appropriate water versus total fluid intake/restriction as well as how to properly read nutrition levels for salt intake monitoring. Provided visual and verbal understanding of salt versus water intake in our bodies and how it affects HF. Additionally provided detail information of how salt is in all foods naturally at some capacity    Discussed physical activity recommendations including starting slow with gradual increases as improvement occurs. Avoid excessive cardio with high increased HR and blood pressure for extended periods of time. Patient should be able to recover and back to baseline within 30 min.    All questions answered, total time spent with patient 55 min.

## 2024-04-15 ENCOUNTER — HOSPITAL ENCOUNTER (OUTPATIENT)
Dept: RADIOLOGY | Facility: MEDICAL CENTER | Age: 39
End: 2024-04-15
Attending: NURSE PRACTITIONER
Payer: COMMERCIAL

## 2024-04-15 DIAGNOSIS — I50.22 CHRONIC SYSTOLIC CONGESTIVE HEART FAILURE, NYHA CLASS 2 (HCC): ICD-10-CM

## 2024-04-15 DIAGNOSIS — I50.20 ACC/AHA STAGE C HEART FAILURE WITH REDUCED EJECTION FRACTION (HCC): ICD-10-CM

## 2024-04-15 PROCEDURE — 700117 HCHG RX CONTRAST REV CODE 255: Performed by: NURSE PRACTITIONER

## 2024-04-15 PROCEDURE — 75561 CARDIAC MRI FOR MORPH W/DYE: CPT

## 2024-04-15 PROCEDURE — A9578 INJ MULTIHANCE MULTIPACK: HCPCS | Performed by: NURSE PRACTITIONER

## 2024-04-15 RX ADMIN — GADOBENATE DIMEGLUMINE 10 ML: 529 INJECTION, SOLUTION INTRAVENOUS at 14:17

## 2024-04-15 RX ADMIN — GADOBENATE DIMEGLUMINE 10 ML: 529 INJECTION, SOLUTION INTRAVENOUS at 14:18

## 2024-04-25 ENCOUNTER — HOSPITAL ENCOUNTER (OUTPATIENT)
Dept: RADIOLOGY | Facility: MEDICAL CENTER | Age: 39
End: 2024-04-25
Attending: INTERNAL MEDICINE
Payer: COMMERCIAL

## 2024-04-25 DIAGNOSIS — R59.1 LYMPHADENOPATHY: ICD-10-CM

## 2024-04-25 PROCEDURE — 71260 CT THORAX DX C+: CPT

## 2024-05-02 ENCOUNTER — HOSPITAL ENCOUNTER (OUTPATIENT)
Dept: RADIOLOGY | Facility: MEDICAL CENTER | Age: 39
End: 2024-05-02
Attending: FAMILY MEDICINE
Payer: COMMERCIAL

## 2024-05-02 DIAGNOSIS — I10 PRIMARY HYPERTENSION: ICD-10-CM

## 2024-05-02 DIAGNOSIS — R80.1 PERSISTENT PROTEINURIA: ICD-10-CM

## 2024-05-02 DIAGNOSIS — I50.20 HFREF (HEART FAILURE WITH REDUCED EJECTION FRACTION) (HCC): ICD-10-CM

## 2024-05-02 PROCEDURE — 93975 VASCULAR STUDY: CPT | Performed by: INTERNAL MEDICINE

## 2024-05-08 ENCOUNTER — TELEPHONE (OUTPATIENT)
Dept: CARDIOLOGY | Facility: MEDICAL CENTER | Age: 39
End: 2024-05-08

## 2024-05-08 ENCOUNTER — TELEMEDICINE (OUTPATIENT)
Dept: CARDIOLOGY | Facility: MEDICAL CENTER | Age: 39
End: 2024-05-08
Attending: NURSE PRACTITIONER
Payer: COMMERCIAL

## 2024-05-08 VITALS — HEIGHT: 71 IN | BODY MASS INDEX: 29.57 KG/M2

## 2024-05-08 DIAGNOSIS — I50.22 CHRONIC SYSTOLIC CONGESTIVE HEART FAILURE, NYHA CLASS 2 (HCC): ICD-10-CM

## 2024-05-08 DIAGNOSIS — I10 ESSENTIAL HYPERTENSION, BENIGN: ICD-10-CM

## 2024-05-08 DIAGNOSIS — I42.0 DILATED CARDIOMYOPATHY (HCC): ICD-10-CM

## 2024-05-08 DIAGNOSIS — I50.20 ACC/AHA STAGE C HEART FAILURE WITH REDUCED EJECTION FRACTION (HCC): ICD-10-CM

## 2024-05-08 DIAGNOSIS — I42.8 NONISCHEMIC CARDIOMYOPATHY (HCC): ICD-10-CM

## 2024-05-08 DIAGNOSIS — I50.20 HFREF (HEART FAILURE WITH REDUCED EJECTION FRACTION) (HCC): ICD-10-CM

## 2024-05-08 PROCEDURE — 99213 OFFICE O/P EST LOW 20 MIN: CPT | Mod: 95 | Performed by: NURSE PRACTITIONER

## 2024-05-08 RX ORDER — CARVEDILOL 25 MG/1
25 TABLET ORAL 2 TIMES DAILY WITH MEALS
Qty: 180 TABLET | Refills: 3 | Status: SHIPPED | OUTPATIENT
Start: 2024-05-08

## 2024-05-08 NOTE — TELEPHONE ENCOUNTER
Phone number called: [phone] (home)      Call outcome: pt was needing help with his virtual visit pt able to complete the visit.

## 2024-05-08 NOTE — TELEPHONE ENCOUNTER
GWENDOLYN    Caller:  Gaetano    Topic/issue: Gaetano just got the call for his VV     Callback Number: [phone]    Thank you,   Carine IBRAHIM

## 2024-05-08 NOTE — Clinical Note
Can we get this patient set up with [facility] and a cardiomyopathy genetic testing kit?  I can sign the form also patient information is filled in.  Thank you

## 2024-05-08 NOTE — PROGRESS NOTES
"Chief Complaint   Patient presents with    Congestive Heart Failure     F/v dx: ACC/AHA stage C heart failure with reduced ejection fraction (HCC)       This evaluation was conducted via Zoom using secure and encrypted videoconferencing technology. The patient was in a private location outside of their home in the state [facility].    The patient's identity was confirmed and verbal consent was obtained for this virtual visit.     Reynold Boles is a 38 y.o. male who presents today for follow up on his heart failure.    Patient had a recent hospitalization from 2/15/2024 through 2024.  He presented with chest pain.  He also had generalized weakness, dizziness and nausea/vomiting.  Patient had an echocardiogram performed which showed an EF of 25%.  He had a stress test which showed no reversible ischemia, but possible prior MI.  Cardiology was consulted, recommended GDMT and recommended follow-up for consideration of outpatient cardiac MRI.    Patient and family and social history includes per Merced Calderon, \"He reports using cocaine in his 20s, with binge drinking, but currently, he does smoke marijuana, does not use any other recreational drugs and has an occasional alcoholic beverage.  He denies any tobacco use.    Patient reports his mother  in her 40s from ALS, and he does not know any medical history from his father as he left the family when he was young.  He does not know of any family medical history of heart disease or heart failure.\"    Patient was last seen by CELENA Garcia on 2024 and myself on 3/26/2024.    Today, patient feels well, denies chest pain, shortness of breath, palpitations, dizziness/lightheadedness, orthopnea, PND or Edema.  Patient continues to slowly increase his activity tolerance but denies exacerbation of symptoms with increased physical activity.  Patient has returned to his occupation without exacerbation of symptoms.    Patient reports " prior to medications blood pressure ranging 130s/80s.  After medications blood pressure well-controlled 110s/70s.    We discussed cardiac MRI results per below.  We will continue medical therapy as tolerated.  We discussed low threshold to increase losartan in the future.  Patient would like to proceed with genetic testing.  Patient to follow-up in 2 months after echocardiogram.      Past Medical History:   Diagnosis Date    Arm fracture, left     CHF (congestive heart failure) (HCC)     Hypertension     New HFrEF (heart failure with reduced ejection fraction) (HCC)      Past Surgical History:   Procedure Laterality Date    HEMORRHOIDECTOMY      age 4     Family History   Problem Relation Age of Onset    Other Mother         carpal    ALS Mother     Diabetes Maternal Uncle     Liver Cancer Maternal Uncle     Hypertension Maternal Grandmother     Hypertension Other         cousin     Social History     Socioeconomic History    Marital status: Single     Spouse name: Not on file    Number of children: Not on file    Years of education: Not on file    Highest education level: Not on file   Occupational History    Not on file   Tobacco Use    Smoking status: Never    Smokeless tobacco: Never   Vaping Use    Vaping Use: Some days    Substances: THC    Devices: Disposable   Substance and Sexual Activity    Alcohol use: Yes     Comment: 1-2 times a month maybe a 1-2 drinks    Drug use: Yes     Types: Marijuana, Cocaine     Comment: past cocaine use a few times college age    Sexual activity: Not Currently     Partners: Female   Other Topics Concern    Not on file   Social History Narrative    Not on file     Social Determinants of Health     Financial Resource Strain: Not on file   Food Insecurity: Not on file   Transportation Needs: Not on file   Physical Activity: Not on file   Stress: Not on file   Social Connections: Not on file   Intimate Partner Violence: Not on file   Housing Stability: Not on file     No Known  "Allergies  Outpatient Encounter Medications as of 5/8/2024   Medication Sig Dispense Refill    carvedilol (COREG) 25 MG Tab Take 1 Tablet by mouth 2 times a day with meals. 180 Tablet 3    dapagliflozin propanediol (FARXIGA) 10 MG Tab Take 1 Tablet by mouth every day. 90 Tablet 1    losartan (COZAAR) 25 MG Tab Take 1 Tablet by mouth every day. 90 Tablet 1    spironolactone (ALDACTONE) 25 MG Tab Take 1 Tablet by mouth every day. 90 Tablet 1    [DISCONTINUED] carvedilol (COREG) 25 MG Tab Take 1 Tablet by mouth 2 times a day with meals. 90 Tablet 1     No facility-administered encounter medications on file as of 5/8/2024.     ROS Complete review of systems negative except as noted in HPI/subjective           Objective     Ht 1.803 m (5' 11\")   BMI 29.57 kg/m²     Physical Exam  Physical Exam:  Constitutional: Alert, no distress, well-groomed.  Skin: No rashes in visible areas.  Eye: Round. Conjunctiva clear, lids normal. No icterus.   ENMT: Lips pink without lesions, good dentition, moist mucous membranes. Phonation normal.  Neck: No masses, no thyromegaly. Moves freely without pain.  CV: Pulse as reported by patient  Respiratory: Unlabored respiratory effort, no cough or audible wheeze  Psych: Alert and oriented x3, normal affect and mood.          Lab Results   Component Value Date/Time    CHOLSTRLTOT 197 02/16/2024 08:50 AM     (H) 02/16/2024 08:50 AM    HDL 51 02/16/2024 08:50 AM    TRIGLYCERIDE 81 02/16/2024 08:50 AM       Lab Results   Component Value Date/Time    SODIUM 137 03/16/2024 07:51 AM    POTASSIUM 4.6 03/16/2024 07:51 AM    CHLORIDE 103 03/16/2024 07:51 AM    CO2 22 03/16/2024 07:51 AM    GLUCOSE 123 (H) 03/16/2024 07:51 AM    BUN 27 (H) 03/16/2024 07:51 AM    CREATININE 1.34 03/16/2024 07:51 AM     Lab Results   Component Value Date/Time    ALKPHOSPHAT 78 02/15/2024 12:46 PM    ASTSGOT 38 02/15/2024 12:46 PM    ALTSGPT 53 (H) 02/15/2024 12:46 PM    TBILIRUBIN 1.4 02/15/2024 12:46 PM     "   Transthoracic Echo Report 2/16/2024  Severely reduced left ventricular systolic function.  The left ventricular ejection fraction is visually estimated to be 25%.  The left ventricle is moderately dilated, LVEDD 6.6cm.  Moderately dilated right ventricle.  Reduced right ventricular systolic function with preserved TAPSE of   2.1cm.  Severely dilated >48 mL/sq m.  Mild aortic insufficiency.  Normal estimated right atrial pressure.   Mild pulmonary hypertension, estimated PASP of 35mmHg.   Cannot rule out apical thrombus, consider repeat limited study with contrast if clinically indicated.     No prior study is available for comparison.      Discussed with Dr. Lynne.       Myocardial Perfusion Report 2/16/2024   NUCLEAR IMAGING INTERPRETATION   No convincing reversible ischemia.      There is small to moderate fixed perfusion defect at the mid anterior wall.    This could be myocardial scar.      There is global hypokinesis of left ventricle with a very low LVEF of 24%    which is suggestive of dilated cardiomyopathy. Recommend cardiology    consultation and correlation with echocardiogram.   ECG INTERPRETATION   Negative stress ECG for ischemia.    Date 6MWT MLWHF   2/29/2024 314 m in 6 minutes 52                     Cardiac MRI (4/15/2024):  1. Mildly reduced left ventricular ejection fraction with EF of 44%, improved since prior echocardiogram.  2. No dilatation of the left ventricle based on EDVI.  3. Mildly dilated left atrium..  4. No late gadolinium enhancement in both ventricles.    Renal artery duplex (5/2/2024):   CONCLUSIONS    Technically difficult study due to bowel gas.    Velocities and waveforms are normal throughout the bilateral renal arteries.   Assessment & Plan     1. HFrEF (heart failure with reduced ejection fraction) (HCC)  carvedilol (COREG) 25 MG Tab      2. High risk medication use        3. Essential hypertension, benign        4. Nonischemic cardiomyopathy (HCC)        5. Dilated  cardiomyopathy (HCC)                Medical Decision Making: Today's Assessment/Status/Plan:        HFimpEF, Stage C, Class 1-2, LVEF 45% (previously 25%): Based on physical examination findings, patient is euvolemic. No JVD, lungs are clear to auscultation, no pitting edema in bilateral lower extremities, no ascites.   -Heart failure due to nonischemic cardiomyopathy  -Discussed Heart failure trajectory and prognosis with patient. Will continue to optimize medical therapy as tolerated. Advanced HF treatment, need for remote monitoring consideration at every visit.   -ACE-I/ARB/ARNI: Continue losartan 25 mg daily, consider Entresto in the future once fatigue and blood pressure improves  -Evidence Based Beta-blocker: Continue carvedilol 25 mg twice a day  -Aldosterone Antagonist: Continue spironolactone 25 mg daily  -Diuretic: Consider if needed.  Currently euvolemic on exam  -SGLT2 inhibitor: Continue Farxiga 10 mg daily  -Other: Consider as needed (Hydralazine/Isosorbide, Vericiguat, Corlanor)  -Labs: Will continue to closely monitor for side effects of patient's high risk medication(s) including renal function, liver function NTproBNP/cardiac markers, and electrolytes as needed  -discussed importance of exercise and regular activity  -No significant abnormalities on cardiac MRI reviewed per above  -Discussed/reviewed maintaining a low Sodium and hydration recommendations  -EF greater than 35%  -Reinforced s/sx of worsening heart failure with patient and weight monitoring. Pt verbalizes understanding. Pt to call office or RTC if present.    -PUMP line number [phone] (PUMP) reviewed with patient  -Heart Failure Education: Discussed the Definition of heart failure (linking disease, symptoms, and treatment) and causes of heart failure  Recognition of escalating symptoms and concrete plan for response to particular symptoms  Patient referred for formal heart failure education  Risk modification for heart failure  progression  Specific diet recommendations: 2000 mg sodium diet  Activity as tolerated  Importance of treatment adherence  Behavioral strategies to promote treatment adherence  -Advanced care planning: Advance directive and POLST to be discussed at future visit  -Pharmacotherapy Referral: Consider referral at follow-up visit  -Compliance Barriers: None  -Genetic testing Consideration: Patient would like to proceed with genetic testing will order   -Consideration for LVAD and/or Heart transplant as necessary      Hypertension:  -BP stable  -Continue recommendations per above    FU in clinic in 2 months with Dr. Tineo. Sooner if needed.    Patient verbalizes understanding and agrees with the plan of care.     I personally spent a total of 25 minutes which includes face-to-face time and non-face-to-face time spent on preparing to see the patient, reviewing prior notes and tests, obtaining history from the patient, performing a medically appropriate exam, counseling and educating the patient, ordering medications/tests/procedures/referrals as clinically indicated, and documenting information in the electronic medical record.    Mayi Salmeron A.P.R.N, HF-Cert   [facility]  [phone]    PLEASE NOTE: This Note was created using voice recognition Software. I have made every reasonable attempt to correct obvious errors, but I expect that there are errors of grammar and possibly content that I did not discover before finalizing the note

## 2024-05-09 ENCOUNTER — APPOINTMENT (OUTPATIENT)
Dept: CARDIOLOGY | Facility: MEDICAL CENTER | Age: 39
End: 2024-05-09
Attending: NURSE PRACTITIONER
Payer: COMMERCIAL

## 2024-05-10 ENCOUNTER — TELEPHONE (OUTPATIENT)
Dept: CARDIOLOGY | Facility: MEDICAL CENTER | Age: 39
End: 2024-05-10
Payer: COMMERCIAL

## 2024-05-10 NOTE — TELEPHONE ENCOUNTER
DIRK Cordova.  Ethyl Missy Penaloza RLUIS.  Can we get this patient set up with [facility] and a cardiomyopathy genetic testing kit?  I can sign the form also patient information is filled in.  Thank you    Form placed in ALVIN's desk awaiting for signature.

## 2024-06-27 ENCOUNTER — TELEPHONE (OUTPATIENT)
Dept: CARDIOLOGY | Facility: MEDICAL CENTER | Age: 39
End: 2024-06-27
Payer: COMMERCIAL

## 2024-06-27 NOTE — TELEPHONE ENCOUNTER
JG   PUMP LINE CALL    PT CALLED PUMP LINE STATING THAT HE WAS HAVING SHORTNESS OF BREATH, NO SWELLING, NOT GAINING WEIGHT.  JUST WAS NOT FEELING REALLY GOOD, WANT TO TALK TO A NURSE IF THERE IS ANYTHING HE CAN DO TO FEEL BETTER

## 2024-06-28 ENCOUNTER — HOSPITAL ENCOUNTER (OUTPATIENT)
Dept: CARDIOLOGY | Facility: MEDICAL CENTER | Age: 39
End: 2024-06-28
Attending: STUDENT IN AN ORGANIZED HEALTH CARE EDUCATION/TRAINING PROGRAM
Payer: COMMERCIAL

## 2024-06-28 DIAGNOSIS — I10 PRIMARY HYPERTENSION: ICD-10-CM

## 2024-06-28 DIAGNOSIS — I50.20 ACC/AHA STAGE C HEART FAILURE WITH REDUCED EJECTION FRACTION (HCC): ICD-10-CM

## 2024-06-28 PROCEDURE — 93306 TTE W/DOPPLER COMPLETE: CPT

## 2024-06-28 NOTE — TELEPHONE ENCOUNTER
"Returned pt call, [phone] and reviewed concerns.  Pt states his SOB \"seemed to work out if I rest throughout the day.\"  Noted pt response.  Reassurance given to continue to monitor and to reach out to this office if he has further questions or concerns.    "

## 2024-06-30 LAB
LV EJECT FRACT MOD 2C 99903: 63.48
LV EJECT FRACT MOD 4C 99902: 66.65
LV EJECT FRACT MOD BP 99901: 64.17

## 2024-07-01 ENCOUNTER — OFFICE VISIT (OUTPATIENT)
Dept: CARDIOLOGY | Facility: MEDICAL CENTER | Age: 39
End: 2024-07-01
Attending: STUDENT IN AN ORGANIZED HEALTH CARE EDUCATION/TRAINING PROGRAM
Payer: COMMERCIAL

## 2024-07-01 VITALS
HEIGHT: 71 IN | HEART RATE: 68 BPM | RESPIRATION RATE: 14 BRPM | WEIGHT: 235 LBS | OXYGEN SATURATION: 95 % | BODY MASS INDEX: 32.9 KG/M2 | DIASTOLIC BLOOD PRESSURE: 72 MMHG | SYSTOLIC BLOOD PRESSURE: 112 MMHG

## 2024-07-01 DIAGNOSIS — I42.8 NONISCHEMIC CARDIOMYOPATHY (HCC): ICD-10-CM

## 2024-07-01 DIAGNOSIS — I50.9 CHF (NYHA CLASS II, ACC/AHA STAGE C) (HCC): ICD-10-CM

## 2024-07-01 DIAGNOSIS — E78.5 DYSLIPIDEMIA: ICD-10-CM

## 2024-07-01 DIAGNOSIS — I10 ESSENTIAL HYPERTENSION: ICD-10-CM

## 2024-07-01 DIAGNOSIS — I50.20 HFREF (HEART FAILURE WITH REDUCED EJECTION FRACTION) (HCC): ICD-10-CM

## 2024-07-01 PROCEDURE — 3078F DIAST BP <80 MM HG: CPT | Performed by: STUDENT IN AN ORGANIZED HEALTH CARE EDUCATION/TRAINING PROGRAM

## 2024-07-01 PROCEDURE — 3074F SYST BP LT 130 MM HG: CPT | Performed by: STUDENT IN AN ORGANIZED HEALTH CARE EDUCATION/TRAINING PROGRAM

## 2024-07-01 PROCEDURE — 99215 OFFICE O/P EST HI 40 MIN: CPT | Performed by: STUDENT IN AN ORGANIZED HEALTH CARE EDUCATION/TRAINING PROGRAM

## 2024-07-01 PROCEDURE — 99211 OFF/OP EST MAY X REQ PHY/QHP: CPT | Performed by: STUDENT IN AN ORGANIZED HEALTH CARE EDUCATION/TRAINING PROGRAM

## 2024-07-01 RX ORDER — FUROSEMIDE 20 MG/1
20 TABLET ORAL
Qty: 30 TABLET | Refills: 11 | Status: SHIPPED | OUTPATIENT
Start: 2024-07-01

## 2024-07-01 ASSESSMENT — ENCOUNTER SYMPTOMS
WEAKNESS: 0
COUGH: 0
NEAR-SYNCOPE: 0
PALPITATIONS: 0
PND: 0
DYSPNEA ON EXERTION: 0
VOMITING: 0
SHORTNESS OF BREATH: 0
NAUSEA: 0
ORTHOPNEA: 0
ABDOMINAL PAIN: 0
NIGHT SWEATS: 0
SYNCOPE: 0
DIARRHEA: 0
FEVER: 0
DIZZINESS: 0
IRREGULAR HEARTBEAT: 0
WHEEZING: 0
FOCAL WEAKNESS: 0

## 2024-07-01 ASSESSMENT — FIBROSIS 4 INDEX: FIB4 SCORE: 0.66

## 2024-07-05 ENCOUNTER — OFFICE VISIT (OUTPATIENT)
Dept: MEDICAL GROUP | Facility: MEDICAL CENTER | Age: 39
End: 2024-07-05
Payer: COMMERCIAL

## 2024-07-05 VITALS
SYSTOLIC BLOOD PRESSURE: 116 MMHG | HEART RATE: 64 BPM | DIASTOLIC BLOOD PRESSURE: 70 MMHG | OXYGEN SATURATION: 97 % | BODY MASS INDEX: 32.34 KG/M2 | WEIGHT: 231 LBS | TEMPERATURE: 96.5 F | HEIGHT: 71 IN

## 2024-07-05 DIAGNOSIS — R80.1 PERSISTENT PROTEINURIA: ICD-10-CM

## 2024-07-05 DIAGNOSIS — I50.20 HFREF (HEART FAILURE WITH REDUCED EJECTION FRACTION) (HCC): ICD-10-CM

## 2024-07-05 DIAGNOSIS — R74.8 LIVER ENZYME ELEVATION: ICD-10-CM

## 2024-07-05 DIAGNOSIS — R73.03 PREDIABETES: ICD-10-CM

## 2024-07-05 LAB
HBA1C MFR BLD: 6 % (ref ?–5.8)
POCT INT CON NEG: NEGATIVE
POCT INT CON POS: POSITIVE

## 2024-07-05 PROCEDURE — 3074F SYST BP LT 130 MM HG: CPT | Performed by: FAMILY MEDICINE

## 2024-07-05 PROCEDURE — 99214 OFFICE O/P EST MOD 30 MIN: CPT | Performed by: FAMILY MEDICINE

## 2024-07-05 PROCEDURE — 83036 HEMOGLOBIN GLYCOSYLATED A1C: CPT | Performed by: FAMILY MEDICINE

## 2024-07-05 PROCEDURE — 3078F DIAST BP <80 MM HG: CPT | Performed by: FAMILY MEDICINE

## 2024-07-05 ASSESSMENT — FIBROSIS 4 INDEX: FIB4 SCORE: 0.66

## 2024-07-23 ENCOUNTER — OFFICE VISIT (OUTPATIENT)
Dept: SLEEP MEDICINE | Facility: MEDICAL CENTER | Age: 39
End: 2024-07-23
Attending: INTERNAL MEDICINE
Payer: COMMERCIAL

## 2024-07-23 VITALS
OXYGEN SATURATION: 95 % | HEART RATE: 72 BPM | HEIGHT: 71 IN | WEIGHT: 231 LBS | DIASTOLIC BLOOD PRESSURE: 74 MMHG | BODY MASS INDEX: 32.34 KG/M2 | SYSTOLIC BLOOD PRESSURE: 122 MMHG

## 2024-07-23 DIAGNOSIS — F12.90 MARIJUANA SMOKER: ICD-10-CM

## 2024-07-23 DIAGNOSIS — I50.20 SYSTOLIC HEART FAILURE, UNSPECIFIED HF CHRONICITY (HCC): ICD-10-CM

## 2024-07-23 DIAGNOSIS — R59.1 LYMPHADENOPATHY: ICD-10-CM

## 2024-07-23 PROCEDURE — 3074F SYST BP LT 130 MM HG: CPT | Performed by: INTERNAL MEDICINE

## 2024-07-23 PROCEDURE — 99214 OFFICE O/P EST MOD 30 MIN: CPT | Performed by: INTERNAL MEDICINE

## 2024-07-23 PROCEDURE — 99212 OFFICE O/P EST SF 10 MIN: CPT | Performed by: INTERNAL MEDICINE

## 2024-07-23 PROCEDURE — 3078F DIAST BP <80 MM HG: CPT | Performed by: INTERNAL MEDICINE

## 2024-07-23 ASSESSMENT — ENCOUNTER SYMPTOMS
ORTHOPNEA: 0
NECK PAIN: 0
VOMITING: 0
COUGH: 0
HEARTBURN: 0
SHORTNESS OF BREATH: 0
DIARRHEA: 0
FOCAL WEAKNESS: 0
HEADACHES: 0
BACK PAIN: 0
PALPITATIONS: 0
FEVER: 0
PND: 0
SORE THROAT: 0
EYE PAIN: 0
MYALGIAS: 0
STRIDOR: 0
TREMORS: 0
PHOTOPHOBIA: 0
WHEEZING: 0
EYE DISCHARGE: 0
HEMOPTYSIS: 0
CLAUDICATION: 0
DIAPHORESIS: 0
NAUSEA: 0
SINUS PAIN: 0
WEIGHT LOSS: 0
ABDOMINAL PAIN: 0
DOUBLE VISION: 0
SPUTUM PRODUCTION: 0
WEAKNESS: 0
EYE REDNESS: 0
FALLS: 0
DEPRESSION: 0
SPEECH CHANGE: 0
CONSTIPATION: 0
DIZZINESS: 0
CHILLS: 0
BLURRED VISION: 0

## 2024-07-23 ASSESSMENT — FIBROSIS 4 INDEX: FIB4 SCORE: 0.66

## 2024-07-29 NOTE — PROGRESS NOTES
"No chief complaint on file.    This evaluation was conducted via Zoom using secure and encrypted videoconferencing technology. The patient was in a private location outside of their home in the state of [place].    The patient's identity was confirmed and verbal consent was obtained for this virtual visit.     Subjective     Gaetano Boles is a 38 y.o. male who presents today for follow up on his heart failure.    Patient had a recent hospitalization from 2/15/2024 through 2024.  He presented with chest pain.  He also had generalized weakness, dizziness and nausea/vomiting.  Patient had an echocardiogram performed which showed an EF of 25%.  He had a stress test which showed no reversible ischemia, but possible prior MI.  Cardiology was consulted, recommended GDMT and recommended follow-up for consideration of outpatient cardiac MRI.    Patient and family and social history includes per Merced Calderon, \"He reports using cocaine in his 20s, with binge drinking, but currently, he does smoke marijuana, does not use any other recreational drugs and has an occasional alcoholic beverage.  He denies any tobacco use.    Patient reports his mother  in her 40s from ALS, and he does not know any medical history from his father as he left the family when he was young.  He does not know of any family medical history of heart disease or heart failure.\"    Patient of Dr. Tineo, was last seen on 2024 and by myself on 2024. At last appointment, Dr. Tineo discussed recovered EF.    ***Today, patient feels well, denies chest pain, shortness of breath, palpitations, dizziness/lightheadedness, orthopnea, PND or Edema.  Patient continues to slowly increase his activity tolerance but denies exacerbation of symptoms with increased physical activity.  Patient has returned to his occupation without exacerbation of symptoms.    Patient reports prior to medications blood pressure ranging 130s/80s.  After medications " blood pressure well-controlled 110s/70s.    We discussed cardiac MRI results per below.  We will continue medical therapy as tolerated.  We discussed low threshold to increase losartan in the future.  Patient would like to proceed with genetic testing.  Patient to follow-up in 2 months after echocardiogram.      Past Medical History:   Diagnosis Date    Arm fracture, left     CHF (congestive heart failure) (HCC)     Hypertension     New HFrEF (heart failure with reduced ejection fraction) (HCC)      Past Surgical History:   Procedure Laterality Date    HEMORRHOIDECTOMY      age 4     Family History   Problem Relation Age of Onset    Other Mother         carpal    ALS Mother     Diabetes Maternal Uncle     Liver Cancer Maternal Uncle     Hypertension Maternal Grandmother     Hypertension Other         cousin     Social History     Socioeconomic History    Marital status: Single     Spouse name: Not on file    Number of children: Not on file    Years of education: Not on file    Highest education level: Not on file   Occupational History    Not on file   Tobacco Use    Smoking status: Never    Smokeless tobacco: Never   Vaping Use    Vaping status: Some Days    Substances: THC    Devices: Disposable   Substance and Sexual Activity    Alcohol use: Yes     Comment: 1-2 times a month maybe a 1-2 drinks    Drug use: Yes     Types: Marijuana, Cocaine     Comment: past cocaine use a few times college age    Sexual activity: Not Currently     Partners: Female   Other Topics Concern    Not on file   Social History Narrative    Not on file     Social Determinants of Health     Financial Resource Strain: Not on file   Food Insecurity: Not on file   Transportation Needs: Not on file   Physical Activity: Not on file   Stress: Not on file   Social Connections: Not on file   Intimate Partner Violence: Not on file   Housing Stability: Not on file     No Known Allergies  Outpatient Encounter Medications as of 7/31/2024   Medication  Sig Dispense Refill    furosemide (LASIX) 20 MG Tab Take 1 Tablet by mouth 1 time a day as needed (weight gain 3lbs or more). 30 Tablet 11    carvedilol (COREG) 25 MG Tab Take 1 Tablet by mouth 2 times a day with meals. 180 Tablet 3    dapagliflozin propanediol (FARXIGA) 10 MG Tab Take 1 Tablet by mouth every day. 90 Tablet 1    losartan (COZAAR) 25 MG Tab Take 1 Tablet by mouth every day. 90 Tablet 1    spironolactone (ALDACTONE) 25 MG Tab Take 1 Tablet by mouth every day. 90 Tablet 1     No facility-administered encounter medications on file as of 7/31/2024.     ROS Complete review of systems negative except as noted in HPI/subjective           Objective     There were no vitals taken for this visit.    Physical Exam  Physical Exam:  Constitutional: Alert, no distress, well-groomed.  Skin: No rashes in visible areas.  Eye: Round. Conjunctiva clear, lids normal. No icterus.   ENMT: Lips pink without lesions, good dentition, moist mucous membranes. Phonation normal.  Neck: No masses, no thyromegaly. Moves freely without pain.  CV: Pulse as reported by patient  Respiratory: Unlabored respiratory effort, no cough or audible wheeze  Psych: Alert and oriented x3, normal affect and mood.          Lab Results   Component Value Date/Time    CHOLSTRLTOT 197 02/16/2024 08:50 AM     (H) 02/16/2024 08:50 AM    HDL 51 02/16/2024 08:50 AM    TRIGLYCERIDE 81 02/16/2024 08:50 AM       Lab Results   Component Value Date/Time    SODIUM 137 03/16/2024 07:51 AM    POTASSIUM 4.6 03/16/2024 07:51 AM    CHLORIDE 103 03/16/2024 07:51 AM    CO2 22 03/16/2024 07:51 AM    GLUCOSE 123 (H) 03/16/2024 07:51 AM    BUN 27 (H) 03/16/2024 07:51 AM    CREATININE 1.34 03/16/2024 07:51 AM     Lab Results   Component Value Date/Time    ALKPHOSPHAT 78 02/15/2024 12:46 PM    ASTSGOT 38 02/15/2024 12:46 PM    ALTSGPT 53 (H) 02/15/2024 12:46 PM    TBILIRUBIN 1.4 02/15/2024 12:46 PM       Transthoracic Echo Report 2/16/2024  Severely reduced left  ventricular systolic function.  The left ventricular ejection fraction is visually estimated to be 25%.  The left ventricle is moderately dilated, LVEDD 6.6cm.  Moderately dilated right ventricle.  Reduced right ventricular systolic function with preserved TAPSE of   2.1cm.  Severely dilated >48 mL/sq m.  Mild aortic insufficiency.  Normal estimated right atrial pressure.   Mild pulmonary hypertension, estimated PASP of 35mmHg.   Cannot rule out apical thrombus, consider repeat limited study with contrast if clinically indicated.     No prior study is available for comparison.      Discussed with Dr. Lynne.       Myocardial Perfusion Report 2/16/2024   NUCLEAR IMAGING INTERPRETATION   No convincing reversible ischemia.      There is small to moderate fixed perfusion defect at the mid anterior wall.    This could be myocardial scar.      There is global hypokinesis of left ventricle with a very low LVEF of 24%    which is suggestive of dilated cardiomyopathy. Recommend cardiology    consultation and correlation with echocardiogram.   ECG INTERPRETATION   Negative stress ECG for ischemia.    Date 6MWT MLWHF   2/29/2024 314 m in 6 minutes 52                     Cardiac MRI (4/15/2024):  1. Mildly reduced left ventricular ejection fraction with EF of 44%, improved since prior echocardiogram.  2. No dilatation of the left ventricle based on EDVI.  3. Mildly dilated left atrium..  4. No late gadolinium enhancement in both ventricles.    Renal artery duplex (5/2/2024):   CONCLUSIONS    Technically difficult study due to bowel gas.    Velocities and waveforms are normal throughout the bilateral renal arteries.     Echocardiogram 6/28/2024  CONCLUSIONS  Normal left ventricular systolic function. The ejection fraction is   measured to be  64% by Abdullahi's biplane.   Grade I diastolic dysfunction.  Normal right ventricular size and systolic function.  No significant valvular abnormalities.   Compared to the prior study on  02/16/2024, left ventricular systolic   function is now normal.  Assessment & Plan     No diagnosis found.          Medical Decision Making: Today's Assessment/Status/Plan:        HFimpEF, Stage C, Class 1-2, LVEF 60% (previously 25%): Based on physical examination findings, patient is euvolemic. No JVD, lungs are clear to auscultation, no pitting edema in bilateral lower extremities, no ascites.   -Heart failure due to nonischemic cardiomyopathy  -Discussed Heart failure trajectory and prognosis with patient. Will continue to optimize medical therapy as tolerated. Advanced HF treatment, need for remote monitoring consideration at every visit.   -ACE-I/ARB/ARNI: Continue losartan 25 mg daily, consider Entresto in the future once fatigue and blood pressure improves  -Evidence Based Beta-blocker: Continue carvedilol 25 mg twice a day  -Aldosterone Antagonist: Continue spironolactone 25 mg daily  -Diuretic: Consider if needed.  Currently euvolemic on exam. Dry weight in office 230 lbs.  -SGLT2 inhibitor: Continue Farxiga 10 mg daily  -Other: Consider as needed (Hydralazine/Isosorbide, Vericiguat, Corlanor)  -Labs: Will continue to closely monitor for side effects of patient's high risk medication(s) including renal function, liver function NTproBNP/cardiac markers, and electrolytes as needed  -discussed importance of exercise and regular activity  -No significant abnormalities on cardiac MRI reviewed per above  -Discussed/reviewed maintaining a low Sodium and hydration recommendations  -EF greater than 35%  -Reinforced s/sx of worsening heart failure with patient and weight monitoring. Pt verbalizes understanding. Pt to call office or RTC if present.    -PUMP line number [id number] (PUMP) reviewed with patient  -Heart Failure Education: Discussed the Definition of heart failure (linking disease, symptoms, and treatment) and causes of heart failure  Recognition of escalating symptoms and concrete plan for response to  particular symptoms  Patient referred for formal heart failure education  Risk modification for heart failure progression  Specific diet recommendations: 2000 mg sodium diet  Activity as tolerated  Importance of treatment adherence  Behavioral strategies to promote treatment adherence  -Advanced care planning: Advance directive and POLST to be discussed at future visit  -Pharmacotherapy Referral: Consider referral at follow-up visit  -Compliance Barriers: None  -Genetic testing Consideration: Patient would like to proceed with genetic testing will order   -Consideration for LVAD and/or Heart transplant as necessary      Hypertension:  -BP stable  -Continue recommendations per above    FU in clinic in *** months with ***. Sooner if needed.    Patient verbalizes understanding and agrees with the plan of care.     I personally spent a total of *** minutes which includes face-to-face time and non-face-to-face time spent on preparing to see the patient, reviewing prior notes and tests, obtaining history from the patient, performing a medically appropriate exam, counseling and educating the patient, ordering medications/tests/procedures/referrals as clinically indicated, and documenting information in the electronic medical record.    Mayi Salmeron A.P.R.N, HF-Cert   [facility]  [phone]    PLEASE NOTE: This Note was created using voice recognition Software. I have made every reasonable attempt to correct obvious errors, but I expect that there are errors of grammar and possibly content that I did not discover before finalizing the note

## 2024-07-30 ENCOUNTER — APPOINTMENT (OUTPATIENT)
Dept: CARDIOLOGY | Facility: MEDICAL CENTER | Age: 39
End: 2024-07-30
Attending: NURSE PRACTITIONER
Payer: COMMERCIAL

## 2024-07-31 ENCOUNTER — APPOINTMENT (OUTPATIENT)
Dept: CARDIOLOGY | Facility: MEDICAL CENTER | Age: 39
End: 2024-07-31
Attending: NURSE PRACTITIONER
Payer: COMMERCIAL

## 2024-10-02 DIAGNOSIS — R73.03 PREDIABETES: ICD-10-CM

## 2024-10-02 DIAGNOSIS — I50.20 HFREF (HEART FAILURE WITH REDUCED EJECTION FRACTION) (HCC): ICD-10-CM

## 2024-10-02 DIAGNOSIS — I50.21 ACUTE HFREF (HEART FAILURE WITH REDUCED EJECTION FRACTION) (HCC): ICD-10-CM

## 2024-10-02 RX ORDER — SPIRONOLACTONE 25 MG/1
25 TABLET ORAL DAILY
Qty: 90 TABLET | Refills: 3 | Status: SHIPPED | OUTPATIENT
Start: 2024-10-02

## 2024-10-02 RX ORDER — LOSARTAN POTASSIUM 25 MG/1
25 TABLET ORAL DAILY
Qty: 90 TABLET | Refills: 3 | Status: SHIPPED | OUTPATIENT
Start: 2024-10-02

## 2024-10-02 RX ORDER — DAPAGLIFLOZIN 10 MG/1
10 TABLET, FILM COATED ORAL DAILY
Qty: 90 TABLET | Refills: 3 | Status: SHIPPED | OUTPATIENT
Start: 2024-10-02

## 2024-10-07 ENCOUNTER — APPOINTMENT (OUTPATIENT)
Dept: MEDICAL GROUP | Facility: MEDICAL CENTER | Age: 39
End: 2024-10-07
Payer: COMMERCIAL

## 2024-12-10 ENCOUNTER — APPOINTMENT (OUTPATIENT)
Dept: MEDICAL GROUP | Facility: MEDICAL CENTER | Age: 39
End: 2024-12-10
Payer: COMMERCIAL

## 2024-12-10 VITALS
HEIGHT: 71 IN | WEIGHT: 237 LBS | BODY MASS INDEX: 33.18 KG/M2 | OXYGEN SATURATION: 95 % | TEMPERATURE: 96.9 F | DIASTOLIC BLOOD PRESSURE: 66 MMHG | RESPIRATION RATE: 15 BRPM | HEART RATE: 84 BPM | SYSTOLIC BLOOD PRESSURE: 108 MMHG

## 2024-12-10 DIAGNOSIS — Z11.4 ENCOUNTER FOR SCREENING FOR HIV: ICD-10-CM

## 2024-12-10 DIAGNOSIS — E78.00 ELEVATED LDL CHOLESTEROL LEVEL: ICD-10-CM

## 2024-12-10 DIAGNOSIS — I10 PRIMARY HYPERTENSION: ICD-10-CM

## 2024-12-10 DIAGNOSIS — Z11.59 NEED FOR HEPATITIS C SCREENING TEST: ICD-10-CM

## 2024-12-10 DIAGNOSIS — E66.811 OBESITY (BMI 30.0-34.9): ICD-10-CM

## 2024-12-10 DIAGNOSIS — R73.03 PREDIABETES: ICD-10-CM

## 2024-12-10 DIAGNOSIS — I50.20 HFREF (HEART FAILURE WITH REDUCED EJECTION FRACTION) (HCC): ICD-10-CM

## 2024-12-10 DIAGNOSIS — R80.1 PERSISTENT PROTEINURIA: ICD-10-CM

## 2024-12-10 DIAGNOSIS — Z23 NEED FOR VACCINATION: ICD-10-CM

## 2024-12-10 PROCEDURE — 3074F SYST BP LT 130 MM HG: CPT | Performed by: FAMILY MEDICINE

## 2024-12-10 PROCEDURE — 99214 OFFICE O/P EST MOD 30 MIN: CPT | Mod: 25 | Performed by: FAMILY MEDICINE

## 2024-12-10 PROCEDURE — 90656 IIV3 VACC NO PRSV 0.5 ML IM: CPT

## 2024-12-10 PROCEDURE — 90471 IMMUNIZATION ADMIN: CPT

## 2024-12-10 PROCEDURE — 3078F DIAST BP <80 MM HG: CPT | Performed by: FAMILY MEDICINE

## 2024-12-10 ASSESSMENT — FIBROSIS 4 INDEX: FIB4 SCORE: 0.68

## 2024-12-10 NOTE — PROGRESS NOTES
"Verbal consent was acquired by the patient to use [employer] ambient listening note generation during this visit    Subjective:     CC: \"chronic condition follow up\"    History of Present Illness  The patient presents for evaluation of weight gain, diabetes, and edema.    He reports a general sense of well-being with no new symptoms. His respiratory function is satisfactory, and he has been engaging in physical activities such as walking and hiking, albeit at a slower pace. He does not experience shortness of breath during these activities. He has been adhering to his cardiologist's advice regarding the use of furosemide, which he takes only when necessary. He has been monitoring his salt intake, although he admits to occasional indulgence during the holiday season. He has been consuming leftover smoked food from [employer]. He is currently on Farxiga 10 mg, losartan 25 mg, spironolactone 25 mg, furosemide 20 mg as needed, and Coreg 25 mg twice daily. He has not run out of his medications.    He experiences fluid retention in his legs when he does not take furosemide, but this symptom improves over time with medication. His weight has increased since his last visit, which he attributes to a lack of exercise rather than fluid retention. He has been monitoring his salt intake, although he admits to occasional indulgence during the holiday season. He has been consuming leftover smoked food from [employer].    He has been managing his diabetes effectively, with his last A1c reading at 6.0. He is currently on Farxiga 10 mg, losartan 25 mg, spironolactone 25 mg, furosemide 20 mg as needed, and Coreg 25 mg twice daily. He has not run out of his medications.    MEDICATIONS  Farxiga 10 mg, losartan 25 mg, spironolactone 25 mg, furosemide 20 mg (as needed), Coreg 25 mg (twice a day)          Objective:     Exam:  /66   Pulse 84   Temp 36.1 °C (96.9 °F) (Temporal)   Resp 15   Ht 1.803 m (5' 11\")   Wt 108 kg " (237 lb)   SpO2 95%   BMI 33.05 kg/m²  Body mass index is 33.05 kg/m².    Physical Exam  Vitals reviewed.   Constitutional:       General: He is not in acute distress.     Appearance: Normal appearance. He is obese. He is not ill-appearing.   HENT:      Head: Normocephalic and atraumatic.   Cardiovascular:      Rate and Rhythm: Normal rate and regular rhythm.      Heart sounds: Normal heart sounds.   Pulmonary:      Effort: Pulmonary effort is normal. No respiratory distress.      Breath sounds: Normal breath sounds.   Musculoskeletal:      Right lower leg: No edema.      Left lower leg: No edema.   Skin:     General: Skin is warm and dry.   Neurological:      Mental Status: He is alert. Mental status is at baseline.   Psychiatric:         Mood and Affect: Mood normal.         Behavior: Behavior normal.           Results  Laboratory Studies  Last A1c was 6.0.    Imaging  Last echo in June showed ejection fraction was 64%.      Assessment & Plan:       1. Obesity (BMI 30.0-34.9)  - CBC WITHOUT DIFFERENTIAL; Future  - Comp Metabolic Panel; Future  - HEMOGLOBIN A1C; Future    2. HFrEF (heart failure with reduced ejection fraction) (HCC)  - CBC WITHOUT DIFFERENTIAL; Future  - Comp Metabolic Panel; Future  - proBrain Natriuretic Peptide, NT; Future    3. Prediabetes  - Comp Metabolic Panel; Future  - HEMOGLOBIN A1C; Future    4. Primary hypertension  - CBC WITHOUT DIFFERENTIAL; Future  - Comp Metabolic Panel; Future  - MICROALBUMIN CREAT RATIO URINE; Future  - proBrain Natriuretic Peptide, NT; Future  - PROTEIN/CREAT RATIO URINE; Future    5. Persistent proteinuria  - Comp Metabolic Panel; Future  - MICROALBUMIN CREAT RATIO URINE; Future  - Referral to Nephrology  - PROTEIN/CREAT RATIO URINE; Future    6. Elevated LDL cholesterol level  - Lipid Profile; Future    7. Need for hepatitis C screening test  - HEP C VIRUS ANTIBODY; Future    8. Encounter for screening for HIV  - HIV AG/AB COMBO ASSAY SCREENING; Future    9.  Need for vaccination  - INFLUENZA VACCINE TRI INJ (PF)      Assessment & Plan  1. Weight gain.  His weight has increased since the last visit, likely due to decreased physical activity and dietary habits during the holidays. He is advised to monitor his diet, particularly sodium intake, and engage in regular exercise.    2. HFrEF  His last echocardiogram in June showed an ejection fraction of 64%, indicating good cardiac function. He is advised to schedule an appointment with cardiology for follow-up. He experiences fluid retention when he does not take furosemide. He is currently taking furosemide 20 mg as needed for edema. He is advised to continue monitoring his symptoms and take furosemide as directed by his cardiologist.    3. Prediabetes  His last A1c was 6.0. He is advised to continue his current medication regimen and maintain a balanced diet. An A1c test will be ordered to monitor his blood sugar levels.    4. Hypertension.  Chronic and controlled. He is currently taking losartan 25 mg, spironolactone 25 mg, and Coreg (carvedilol) 25 mg twice a day for blood pressure management. He is advised to continue his current medication regimen.    Health Maintenance.  A comprehensive set of labs, including microalbumin, urine protein, BNP, and cholesterol, will be ordered. He will receive his influenza vaccine today.    Follow-up  The patient will follow up in 6 months.         Return in about 6 months (around 6/10/2025), or if symptoms worsen or fail to improve, for Lab F/U, Medication F/U.      This note was created using voice recognition software (Dragon). The accuracy of the dictation is limited by the abilities of the software. I have reviewed the note prior to signing, however some errors in grammar and context are still possible. If you have any questions related to this note please do not hesitate to contact our office.

## 2025-04-12 DIAGNOSIS — I50.20 HFREF (HEART FAILURE WITH REDUCED EJECTION FRACTION) (HCC): ICD-10-CM

## 2025-04-14 NOTE — TELEPHONE ENCOUNTER
Is the patient due for a refill? Yes    Was the patient seen the last 15 months? Yes    Date of last office visit: 7/1/24    Does the patient have an upcoming appointment?  No    Provider to refill:HK    Does the patient have FDC Plus and need 100-day supply? (This applies to ALL medications) Patient does not have SCP

## 2025-04-15 RX ORDER — CARVEDILOL 25 MG/1
25 TABLET ORAL 2 TIMES DAILY WITH MEALS
Qty: 180 TABLET | Refills: 0 | Status: SHIPPED | OUTPATIENT
Start: 2025-04-15

## 2025-06-11 ENCOUNTER — APPOINTMENT (OUTPATIENT)
Dept: MEDICAL GROUP | Facility: MEDICAL CENTER | Age: 40
End: 2025-06-11
Payer: COMMERCIAL